# Patient Record
Sex: MALE | Race: WHITE | Employment: OTHER | URBAN - NONMETROPOLITAN AREA
[De-identification: names, ages, dates, MRNs, and addresses within clinical notes are randomized per-mention and may not be internally consistent; named-entity substitution may affect disease eponyms.]

---

## 2017-03-03 ENCOUNTER — TELEPHONE (OUTPATIENT)
Dept: CARDIOLOGY | Age: 42
End: 2017-03-03

## 2017-03-13 ENCOUNTER — TELEPHONE (OUTPATIENT)
Dept: CARDIOLOGY | Age: 42
End: 2017-03-13

## 2019-11-16 ENCOUNTER — HOSPITAL ENCOUNTER (INPATIENT)
Age: 44
LOS: 2 days | Discharge: HOME | DRG: 287 | End: 2019-11-19
Attending: HOSPITALIST | Admitting: FAMILY MEDICINE

## 2019-11-16 ENCOUNTER — APPOINTMENT (OUTPATIENT)
Dept: GENERAL RADIOLOGY | Age: 44
DRG: 287 | End: 2019-11-16
Attending: HOSPITALIST

## 2019-11-16 DIAGNOSIS — E78.2 MIXED HYPERLIPIDEMIA: ICD-10-CM

## 2019-11-16 DIAGNOSIS — I25.10 CORONARY ARTERY DISEASE INVOLVING NATIVE CORONARY ARTERY OF NATIVE HEART WITHOUT ANGINA PECTORIS: ICD-10-CM

## 2019-11-16 PROBLEM — I50.22 CHRONIC SYSTOLIC CONGESTIVE HEART FAILURE (HCC): Status: ACTIVE | Noted: 2019-11-16

## 2019-11-16 PROBLEM — I20.9 ISCHEMIC CHEST PAIN (HCC): Status: ACTIVE | Noted: 2019-11-16

## 2019-11-16 LAB
ALBUMIN SERPL-MCNC: 4.2 G/DL (ref 3.5–5.2)
ALP BLD-CCNC: 121 U/L (ref 40–130)
ALT SERPL-CCNC: 67 U/L (ref 5–41)
ANION GAP SERPL CALCULATED.3IONS-SCNC: 12 MMOL/L (ref 7–19)
APTT: 25.8 SEC (ref 26–36.2)
APTT: 34.9 SEC (ref 26–36.2)
APTT: 51.9 SEC (ref 26–36.2)
AST SERPL-CCNC: 27 U/L (ref 5–40)
BASOPHILS ABSOLUTE: 0.1 K/UL (ref 0–0.2)
BASOPHILS RELATIVE PERCENT: 0.8 % (ref 0–1)
BILIRUB SERPL-MCNC: <0.2 MG/DL (ref 0.2–1.2)
BUN BLDV-MCNC: 15 MG/DL (ref 6–20)
CALCIUM SERPL-MCNC: 9.7 MG/DL (ref 8.6–10)
CHLORIDE BLD-SCNC: 103 MMOL/L (ref 98–111)
CHOLESTEROL, TOTAL: 280 MG/DL (ref 160–199)
CO2: 24 MMOL/L (ref 22–29)
CREAT SERPL-MCNC: 0.8 MG/DL (ref 0.5–1.2)
EOSINOPHILS ABSOLUTE: 0.2 K/UL (ref 0–0.6)
EOSINOPHILS RELATIVE PERCENT: 2.5 % (ref 0–5)
GFR NON-AFRICAN AMERICAN: >60
GLUCOSE BLD-MCNC: 99 MG/DL (ref 74–109)
HBA1C MFR BLD: 5.5 % (ref 4–6)
HCT VFR BLD CALC: 46.5 % (ref 42–52)
HDLC SERPL-MCNC: 41 MG/DL (ref 55–121)
HEMOGLOBIN: 15.3 G/DL (ref 14–18)
IMMATURE GRANULOCYTES #: 0 K/UL
INR BLD: 0.97 (ref 0.88–1.18)
LDL CHOLESTEROL CALCULATED: 198 MG/DL
LV EF: 35 %
LVEF MODALITY: NORMAL
LYMPHOCYTES ABSOLUTE: 3.7 K/UL (ref 1.1–4.5)
LYMPHOCYTES RELATIVE PERCENT: 38.7 % (ref 20–40)
MAGNESIUM: 2.2 MG/DL (ref 1.6–2.6)
MCH RBC QN AUTO: 30.7 PG (ref 27–31)
MCHC RBC AUTO-ENTMCNC: 32.9 G/DL (ref 33–37)
MCV RBC AUTO: 93.2 FL (ref 80–94)
MONOCYTES ABSOLUTE: 0.7 K/UL (ref 0–0.9)
MONOCYTES RELATIVE PERCENT: 7.7 % (ref 0–10)
NEUTROPHILS ABSOLUTE: 4.9 K/UL (ref 1.5–7.5)
NEUTROPHILS RELATIVE PERCENT: 50.1 % (ref 50–65)
PDW BLD-RTO: 13.5 % (ref 11.5–14.5)
PLATELET # BLD: 217 K/UL (ref 130–400)
PMV BLD AUTO: 11.4 FL (ref 9.4–12.4)
POTASSIUM REFLEX MAGNESIUM: 4.4 MMOL/L (ref 3.5–5)
PROTHROMBIN TIME: 12.3 SEC (ref 12–14.6)
RBC # BLD: 4.99 M/UL (ref 4.7–6.1)
SODIUM BLD-SCNC: 139 MMOL/L (ref 136–145)
TOTAL PROTEIN: 7.1 G/DL (ref 6.6–8.7)
TRIGL SERPL-MCNC: 204 MG/DL (ref 0–149)
TROPONIN: 0.06 NG/ML (ref 0–0.03)
TROPONIN: 0.11 NG/ML (ref 0–0.03)
TROPONIN: 0.14 NG/ML (ref 0–0.03)
WBC # BLD: 9.7 K/UL (ref 4.8–10.8)

## 2019-11-16 PROCEDURE — G0378 HOSPITAL OBSERVATION PER HR: HCPCS

## 2019-11-16 PROCEDURE — G0379 DIRECT REFER HOSPITAL OBSERV: HCPCS

## 2019-11-16 PROCEDURE — 2580000003 HC RX 258: Performed by: HOSPITALIST

## 2019-11-16 PROCEDURE — 85730 THROMBOPLASTIN TIME PARTIAL: CPT

## 2019-11-16 PROCEDURE — 6360000002 HC RX W HCPCS: Performed by: INTERNAL MEDICINE

## 2019-11-16 PROCEDURE — 71045 X-RAY EXAM CHEST 1 VIEW: CPT

## 2019-11-16 PROCEDURE — 83735 ASSAY OF MAGNESIUM: CPT

## 2019-11-16 PROCEDURE — 36415 COLL VENOUS BLD VENIPUNCTURE: CPT

## 2019-11-16 PROCEDURE — 84484 ASSAY OF TROPONIN QUANT: CPT

## 2019-11-16 PROCEDURE — 83036 HEMOGLOBIN GLYCOSYLATED A1C: CPT

## 2019-11-16 PROCEDURE — 96366 THER/PROPH/DIAG IV INF ADDON: CPT

## 2019-11-16 PROCEDURE — 85610 PROTHROMBIN TIME: CPT

## 2019-11-16 PROCEDURE — 96365 THER/PROPH/DIAG IV INF INIT: CPT

## 2019-11-16 PROCEDURE — 93306 TTE W/DOPPLER COMPLETE: CPT

## 2019-11-16 PROCEDURE — 6370000000 HC RX 637 (ALT 250 FOR IP): Performed by: HOSPITALIST

## 2019-11-16 PROCEDURE — 85025 COMPLETE CBC W/AUTO DIFF WBC: CPT

## 2019-11-16 PROCEDURE — 80053 COMPREHEN METABOLIC PANEL: CPT

## 2019-11-16 PROCEDURE — 80061 LIPID PANEL: CPT

## 2019-11-16 RX ORDER — HEPARIN SODIUM 1000 [USP'U]/ML
2000 INJECTION, SOLUTION INTRAVENOUS; SUBCUTANEOUS PRN
Status: DISCONTINUED | OUTPATIENT
Start: 2019-11-16 | End: 2019-11-18

## 2019-11-16 RX ORDER — ASPIRIN 81 MG/1
81 TABLET, CHEWABLE ORAL DAILY
Status: DISCONTINUED | OUTPATIENT
Start: 2019-11-17 | End: 2019-11-19 | Stop reason: HOSPADM

## 2019-11-16 RX ORDER — RANITIDINE 150 MG/1
150 TABLET ORAL DAILY
COMMUNITY
End: 2020-02-28

## 2019-11-16 RX ORDER — CLOPIDOGREL BISULFATE 75 MG/1
75 TABLET ORAL DAILY
Status: DISCONTINUED | OUTPATIENT
Start: 2019-11-16 | End: 2019-11-19 | Stop reason: HOSPADM

## 2019-11-16 RX ORDER — NITROGLYCERIN 0.4 MG/1
0.4 TABLET SUBLINGUAL EVERY 5 MIN PRN
Status: DISCONTINUED | OUTPATIENT
Start: 2019-11-16 | End: 2019-11-18

## 2019-11-16 RX ORDER — POLYETHYLENE GLYCOL 3350 17 G/17G
17 POWDER, FOR SOLUTION ORAL DAILY PRN
Status: DISCONTINUED | OUTPATIENT
Start: 2019-11-16 | End: 2019-11-18

## 2019-11-16 RX ORDER — SODIUM CHLORIDE 0.9 % (FLUSH) 0.9 %
10 SYRINGE (ML) INJECTION EVERY 12 HOURS SCHEDULED
Status: DISCONTINUED | OUTPATIENT
Start: 2019-11-16 | End: 2019-11-19 | Stop reason: HOSPADM

## 2019-11-16 RX ORDER — HEPARIN SODIUM 10000 [USP'U]/100ML
10.5 INJECTION, SOLUTION INTRAVENOUS CONTINUOUS
Status: DISCONTINUED | OUTPATIENT
Start: 2019-11-16 | End: 2019-11-18

## 2019-11-16 RX ORDER — HEPARIN SODIUM 1000 [USP'U]/ML
30 INJECTION, SOLUTION INTRAVENOUS; SUBCUTANEOUS PRN
Status: DISCONTINUED | OUTPATIENT
Start: 2019-11-16 | End: 2019-11-16

## 2019-11-16 RX ORDER — HEPARIN SODIUM 1000 [USP'U]/ML
4000 INJECTION, SOLUTION INTRAVENOUS; SUBCUTANEOUS PRN
Status: DISCONTINUED | OUTPATIENT
Start: 2019-11-16 | End: 2019-11-18

## 2019-11-16 RX ORDER — HEPARIN SODIUM 1000 [USP'U]/ML
60 INJECTION, SOLUTION INTRAVENOUS; SUBCUTANEOUS PRN
Status: DISCONTINUED | OUTPATIENT
Start: 2019-11-16 | End: 2019-11-16

## 2019-11-16 RX ORDER — LANOLIN ALCOHOL/MO/W.PET/CERES
3 CREAM (GRAM) TOPICAL NIGHTLY PRN
Status: DISCONTINUED | OUTPATIENT
Start: 2019-11-16 | End: 2019-11-19 | Stop reason: HOSPADM

## 2019-11-16 RX ORDER — HEPARIN SODIUM 1000 [USP'U]/ML
60 INJECTION, SOLUTION INTRAVENOUS; SUBCUTANEOUS ONCE
Status: COMPLETED | OUTPATIENT
Start: 2019-11-16 | End: 2019-11-16

## 2019-11-16 RX ORDER — DOCUSATE SODIUM 100 MG/1
100 CAPSULE, LIQUID FILLED ORAL NIGHTLY
Status: ON HOLD | COMMUNITY
End: 2019-11-19 | Stop reason: HOSPADM

## 2019-11-16 RX ORDER — ONDANSETRON 2 MG/ML
4 INJECTION INTRAMUSCULAR; INTRAVENOUS EVERY 6 HOURS PRN
Status: DISCONTINUED | OUTPATIENT
Start: 2019-11-16 | End: 2019-11-18

## 2019-11-16 RX ORDER — ATORVASTATIN CALCIUM 40 MG/1
40 TABLET, FILM COATED ORAL NIGHTLY
Status: DISCONTINUED | OUTPATIENT
Start: 2019-11-16 | End: 2019-11-19 | Stop reason: HOSPADM

## 2019-11-16 RX ORDER — SODIUM CHLORIDE 0.9 % (FLUSH) 0.9 %
10 SYRINGE (ML) INJECTION PRN
Status: DISCONTINUED | OUTPATIENT
Start: 2019-11-16 | End: 2019-11-19 | Stop reason: HOSPADM

## 2019-11-16 RX ORDER — FAMOTIDINE 20 MG/1
20 TABLET, FILM COATED ORAL DAILY
Status: DISCONTINUED | OUTPATIENT
Start: 2019-11-16 | End: 2019-11-19 | Stop reason: HOSPADM

## 2019-11-16 RX ORDER — METOPROLOL SUCCINATE 50 MG/1
50 TABLET, EXTENDED RELEASE ORAL DAILY
Status: DISCONTINUED | OUTPATIENT
Start: 2019-11-16 | End: 2019-11-18

## 2019-11-16 RX ADMIN — ATORVASTATIN CALCIUM 40 MG: 40 TABLET, FILM COATED ORAL at 19:55

## 2019-11-16 RX ADMIN — HEPARIN SODIUM 10.5 UNITS/KG/HR: 10000 INJECTION, SOLUTION INTRAVENOUS at 09:45

## 2019-11-16 RX ADMIN — CLOPIDOGREL BISULFATE 75 MG: 75 TABLET ORAL at 09:55

## 2019-11-16 RX ADMIN — FAMOTIDINE 20 MG: 20 TABLET ORAL at 09:55

## 2019-11-16 RX ADMIN — METOPROLOL SUCCINATE 50 MG: 50 TABLET, EXTENDED RELEASE ORAL at 09:54

## 2019-11-16 RX ADMIN — Medication 10 ML: at 09:54

## 2019-11-16 RX ADMIN — HEPARIN SODIUM 5670 UNITS: 1000 INJECTION INTRAVENOUS; SUBCUTANEOUS at 09:45

## 2019-11-16 RX ADMIN — HEPARIN SODIUM 4000 UNITS: 1000 INJECTION INTRAVENOUS; SUBCUTANEOUS at 21:53

## 2019-11-16 ASSESSMENT — ENCOUNTER SYMPTOMS
TROUBLE SWALLOWING: 0
ABDOMINAL PAIN: 0
EYE REDNESS: 0
SHORTNESS OF BREATH: 1
EYES NEGATIVE: 1
CHEST TIGHTNESS: 1
COUGH: 0
NAUSEA: 0
ROS SKIN COMMENTS: TATTOOS
SORE THROAT: 0
SHORTNESS OF BREATH: 0
ALLERGIC/IMMUNOLOGIC NEGATIVE: 1
WHEEZING: 0
GASTROINTESTINAL NEGATIVE: 1
COLOR CHANGE: 0
VOMITING: 0

## 2019-11-16 ASSESSMENT — PAIN SCALES - GENERAL
PAINLEVEL_OUTOF10: 0

## 2019-11-17 LAB
ANION GAP SERPL CALCULATED.3IONS-SCNC: 14 MMOL/L (ref 7–19)
APTT: 45.7 SEC (ref 26–36.2)
APTT: 47 SEC (ref 26–36.2)
APTT: 51.7 SEC (ref 26–36.2)
BUN BLDV-MCNC: 16 MG/DL (ref 6–20)
CALCIUM SERPL-MCNC: 9.3 MG/DL (ref 8.6–10)
CHLORIDE BLD-SCNC: 103 MMOL/L (ref 98–111)
CO2: 22 MMOL/L (ref 22–29)
CREAT SERPL-MCNC: 0.9 MG/DL (ref 0.5–1.2)
GFR NON-AFRICAN AMERICAN: >60
GLUCOSE BLD-MCNC: 106 MG/DL (ref 74–109)
HCT VFR BLD CALC: 47.3 % (ref 42–52)
HEMOGLOBIN: 15.1 G/DL (ref 14–18)
MCH RBC QN AUTO: 30.2 PG (ref 27–31)
MCHC RBC AUTO-ENTMCNC: 31.9 G/DL (ref 33–37)
MCV RBC AUTO: 94.6 FL (ref 80–94)
PDW BLD-RTO: 13.4 % (ref 11.5–14.5)
PLATELET # BLD: 218 K/UL (ref 130–400)
PMV BLD AUTO: 11.5 FL (ref 9.4–12.4)
POTASSIUM REFLEX MAGNESIUM: 4.2 MMOL/L (ref 3.5–5)
RBC # BLD: 5 M/UL (ref 4.7–6.1)
SODIUM BLD-SCNC: 139 MMOL/L (ref 136–145)
WBC # BLD: 11.5 K/UL (ref 4.8–10.8)

## 2019-11-17 PROCEDURE — 6360000002 HC RX W HCPCS: Performed by: INTERNAL MEDICINE

## 2019-11-17 PROCEDURE — 2580000003 HC RX 258: Performed by: HOSPITALIST

## 2019-11-17 PROCEDURE — 85730 THROMBOPLASTIN TIME PARTIAL: CPT

## 2019-11-17 PROCEDURE — 6370000000 HC RX 637 (ALT 250 FOR IP): Performed by: HOSPITALIST

## 2019-11-17 PROCEDURE — 2140000000 HC CCU INTERMEDIATE R&B

## 2019-11-17 PROCEDURE — 36415 COLL VENOUS BLD VENIPUNCTURE: CPT

## 2019-11-17 PROCEDURE — 80048 BASIC METABOLIC PNL TOTAL CA: CPT

## 2019-11-17 PROCEDURE — 85027 COMPLETE CBC AUTOMATED: CPT

## 2019-11-17 PROCEDURE — 96366 THER/PROPH/DIAG IV INF ADDON: CPT

## 2019-11-17 RX ADMIN — ATORVASTATIN CALCIUM 40 MG: 40 TABLET, FILM COATED ORAL at 18:21

## 2019-11-17 RX ADMIN — Medication 10 ML: at 08:33

## 2019-11-17 RX ADMIN — METOPROLOL SUCCINATE 50 MG: 50 TABLET, EXTENDED RELEASE ORAL at 08:33

## 2019-11-17 RX ADMIN — HEPARIN SODIUM 2000 UNITS: 1000 INJECTION INTRAVENOUS; SUBCUTANEOUS at 05:25

## 2019-11-17 RX ADMIN — HEPARIN SODIUM 2000 UNITS: 1000 INJECTION INTRAVENOUS; SUBCUTANEOUS at 19:35

## 2019-11-17 RX ADMIN — FAMOTIDINE 20 MG: 20 TABLET ORAL at 08:33

## 2019-11-17 RX ADMIN — ASPIRIN 81 MG 81 MG: 81 TABLET ORAL at 08:33

## 2019-11-17 RX ADMIN — CLOPIDOGREL BISULFATE 75 MG: 75 TABLET ORAL at 08:33

## 2019-11-17 ASSESSMENT — ENCOUNTER SYMPTOMS
WHEEZING: 0
NAUSEA: 0
EYE REDNESS: 0
TROUBLE SWALLOWING: 0
VOMITING: 0
COUGH: 0
SORE THROAT: 0
CHEST TIGHTNESS: 0
COLOR CHANGE: 0

## 2019-11-18 LAB
ANION GAP SERPL CALCULATED.3IONS-SCNC: 14 MMOL/L (ref 7–19)
APTT: 55.1 SEC (ref 26–36.2)
BUN BLDV-MCNC: 14 MG/DL (ref 6–20)
CALCIUM SERPL-MCNC: 9.2 MG/DL (ref 8.6–10)
CHLORIDE BLD-SCNC: 103 MMOL/L (ref 98–111)
CO2: 22 MMOL/L (ref 22–29)
CREAT SERPL-MCNC: 0.9 MG/DL (ref 0.5–1.2)
GFR NON-AFRICAN AMERICAN: >60
GLUCOSE BLD-MCNC: 117 MG/DL (ref 74–109)
HCT VFR BLD CALC: 44.5 % (ref 42–52)
HEMOGLOBIN: 14.7 G/DL (ref 14–18)
MCH RBC QN AUTO: 30.6 PG (ref 27–31)
MCHC RBC AUTO-ENTMCNC: 33 G/DL (ref 33–37)
MCV RBC AUTO: 92.5 FL (ref 80–94)
PDW BLD-RTO: 13.4 % (ref 11.5–14.5)
PLATELET # BLD: 205 K/UL (ref 130–400)
PMV BLD AUTO: 11.7 FL (ref 9.4–12.4)
POTASSIUM REFLEX MAGNESIUM: 3.8 MMOL/L (ref 3.5–5)
RBC # BLD: 4.81 M/UL (ref 4.7–6.1)
SODIUM BLD-SCNC: 139 MMOL/L (ref 136–145)
WBC # BLD: 9.4 K/UL (ref 4.8–10.8)

## 2019-11-18 PROCEDURE — 2580000003 HC RX 258: Performed by: INTERNAL MEDICINE

## 2019-11-18 PROCEDURE — 2140000000 HC CCU INTERMEDIATE R&B

## 2019-11-18 PROCEDURE — 2500000003 HC RX 250 WO HCPCS

## 2019-11-18 PROCEDURE — 6360000004 HC RX CONTRAST MEDICATION: Performed by: FAMILY MEDICINE

## 2019-11-18 PROCEDURE — 6370000000 HC RX 637 (ALT 250 FOR IP): Performed by: INTERNAL MEDICINE

## 2019-11-18 PROCEDURE — 2709999900 HC NON-CHARGEABLE SUPPLY

## 2019-11-18 PROCEDURE — 93458 L HRT ARTERY/VENTRICLE ANGIO: CPT

## 2019-11-18 PROCEDURE — 4A023N7 MEASUREMENT OF CARDIAC SAMPLING AND PRESSURE, LEFT HEART, PERCUTANEOUS APPROACH: ICD-10-PCS | Performed by: INTERNAL MEDICINE

## 2019-11-18 PROCEDURE — C1887 CATHETER, GUIDING: HCPCS

## 2019-11-18 PROCEDURE — 85730 THROMBOPLASTIN TIME PARTIAL: CPT

## 2019-11-18 PROCEDURE — 36415 COLL VENOUS BLD VENIPUNCTURE: CPT

## 2019-11-18 PROCEDURE — 93458 L HRT ARTERY/VENTRICLE ANGIO: CPT | Performed by: INTERNAL MEDICINE

## 2019-11-18 PROCEDURE — 99152 MOD SED SAME PHYS/QHP 5/>YRS: CPT | Performed by: INTERNAL MEDICINE

## 2019-11-18 PROCEDURE — B2151ZZ FLUOROSCOPY OF LEFT HEART USING LOW OSMOLAR CONTRAST: ICD-10-PCS | Performed by: INTERNAL MEDICINE

## 2019-11-18 PROCEDURE — C1769 GUIDE WIRE: HCPCS

## 2019-11-18 PROCEDURE — 99152 MOD SED SAME PHYS/QHP 5/>YRS: CPT

## 2019-11-18 PROCEDURE — 6360000002 HC RX W HCPCS: Performed by: INTERNAL MEDICINE

## 2019-11-18 PROCEDURE — 85027 COMPLETE CBC AUTOMATED: CPT

## 2019-11-18 PROCEDURE — 6360000002 HC RX W HCPCS

## 2019-11-18 PROCEDURE — 80048 BASIC METABOLIC PNL TOTAL CA: CPT

## 2019-11-18 PROCEDURE — C1894 INTRO/SHEATH, NON-LASER: HCPCS

## 2019-11-18 PROCEDURE — B2111ZZ FLUOROSCOPY OF MULTIPLE CORONARY ARTERIES USING LOW OSMOLAR CONTRAST: ICD-10-PCS | Performed by: INTERNAL MEDICINE

## 2019-11-18 PROCEDURE — 99153 MOD SED SAME PHYS/QHP EA: CPT

## 2019-11-18 RX ORDER — SODIUM CHLORIDE 0.9 % (FLUSH) 0.9 %
10 SYRINGE (ML) INJECTION PRN
Status: DISCONTINUED | OUTPATIENT
Start: 2019-11-18 | End: 2019-11-19 | Stop reason: HOSPADM

## 2019-11-18 RX ORDER — LISINOPRIL 5 MG/1
5 TABLET ORAL DAILY
Status: DISCONTINUED | OUTPATIENT
Start: 2019-11-18 | End: 2019-11-19 | Stop reason: HOSPADM

## 2019-11-18 RX ORDER — SODIUM CHLORIDE 0.9 % (FLUSH) 0.9 %
10 SYRINGE (ML) INJECTION EVERY 12 HOURS SCHEDULED
Status: DISCONTINUED | OUTPATIENT
Start: 2019-11-18 | End: 2019-11-19 | Stop reason: HOSPADM

## 2019-11-18 RX ORDER — ACETAMINOPHEN 325 MG/1
650 TABLET ORAL EVERY 4 HOURS PRN
Status: DISCONTINUED | OUTPATIENT
Start: 2019-11-18 | End: 2019-11-19 | Stop reason: HOSPADM

## 2019-11-18 RX ORDER — ONDANSETRON 2 MG/ML
4 INJECTION INTRAMUSCULAR; INTRAVENOUS EVERY 6 HOURS PRN
Status: DISCONTINUED | OUTPATIENT
Start: 2019-11-18 | End: 2019-11-19 | Stop reason: HOSPADM

## 2019-11-18 RX ORDER — CARVEDILOL 6.25 MG/1
6.25 TABLET ORAL 2 TIMES DAILY WITH MEALS
Status: DISCONTINUED | OUTPATIENT
Start: 2019-11-18 | End: 2019-11-19 | Stop reason: HOSPADM

## 2019-11-18 RX ORDER — SODIUM CHLORIDE 9 MG/ML
INJECTION, SOLUTION INTRAVENOUS CONTINUOUS
Status: DISCONTINUED | OUTPATIENT
Start: 2019-11-18 | End: 2019-11-19 | Stop reason: HOSPADM

## 2019-11-18 RX ADMIN — ATORVASTATIN CALCIUM 40 MG: 40 TABLET, FILM COATED ORAL at 20:20

## 2019-11-18 RX ADMIN — SODIUM CHLORIDE: 9 INJECTION, SOLUTION INTRAVENOUS at 08:51

## 2019-11-18 RX ADMIN — IOPAMIDOL 97 ML: 612 INJECTION, SOLUTION INTRAVENOUS at 08:19

## 2019-11-18 RX ADMIN — ASPIRIN 81 MG 81 MG: 81 TABLET ORAL at 08:51

## 2019-11-18 RX ADMIN — CARVEDILOL 6.25 MG: 6.25 TABLET, FILM COATED ORAL at 08:51

## 2019-11-18 RX ADMIN — ACETAMINOPHEN 650 MG: 325 TABLET ORAL at 20:20

## 2019-11-18 RX ADMIN — CARVEDILOL 6.25 MG: 6.25 TABLET, FILM COATED ORAL at 20:20

## 2019-11-18 RX ADMIN — Medication 10 ML: at 20:20

## 2019-11-18 RX ADMIN — FAMOTIDINE 20 MG: 20 TABLET ORAL at 08:51

## 2019-11-18 RX ADMIN — SODIUM CHLORIDE: 9 INJECTION, SOLUTION INTRAVENOUS at 05:18

## 2019-11-18 RX ADMIN — HEPARIN SODIUM 13.9 UNITS/KG/HR: 10000 INJECTION, SOLUTION INTRAVENOUS at 05:33

## 2019-11-18 RX ADMIN — CLOPIDOGREL BISULFATE 75 MG: 75 TABLET ORAL at 08:51

## 2019-11-18 RX ADMIN — LISINOPRIL 5 MG: 5 TABLET ORAL at 08:51

## 2019-11-18 ASSESSMENT — PAIN SCALES - GENERAL
PAINLEVEL_OUTOF10: 0
PAINLEVEL_OUTOF10: 0
PAINLEVEL_OUTOF10: 2
PAINLEVEL_OUTOF10: 2

## 2019-11-18 ASSESSMENT — ENCOUNTER SYMPTOMS
SORE THROAT: 0
COUGH: 0
COLOR CHANGE: 0
VOMITING: 0
WHEEZING: 0
CHEST TIGHTNESS: 0
TROUBLE SWALLOWING: 0
NAUSEA: 0
EYE REDNESS: 0

## 2019-11-18 ASSESSMENT — PAIN DESCRIPTION - FREQUENCY: FREQUENCY: INTERMITTENT

## 2019-11-18 ASSESSMENT — PAIN DESCRIPTION - PAIN TYPE
TYPE: ACUTE PAIN
TYPE: ACUTE PAIN

## 2019-11-18 ASSESSMENT — PAIN DESCRIPTION - ORIENTATION
ORIENTATION: LEFT
ORIENTATION: LEFT

## 2019-11-18 ASSESSMENT — PAIN DESCRIPTION - DESCRIPTORS: DESCRIPTORS: SQUEEZING

## 2019-11-18 ASSESSMENT — PAIN DESCRIPTION - LOCATION
LOCATION: ARM;SHOULDER;NECK
LOCATION: SHOULDER

## 2019-11-19 VITALS
HEIGHT: 71 IN | WEIGHT: 209.13 LBS | OXYGEN SATURATION: 96 % | BODY MASS INDEX: 29.28 KG/M2 | SYSTOLIC BLOOD PRESSURE: 100 MMHG | DIASTOLIC BLOOD PRESSURE: 68 MMHG | TEMPERATURE: 97.2 F | HEART RATE: 71 BPM | RESPIRATION RATE: 16 BRPM

## 2019-11-19 LAB
ANION GAP SERPL CALCULATED.3IONS-SCNC: 12 MMOL/L (ref 7–19)
BUN BLDV-MCNC: 13 MG/DL (ref 6–20)
CALCIUM SERPL-MCNC: 9 MG/DL (ref 8.6–10)
CHLORIDE BLD-SCNC: 103 MMOL/L (ref 98–111)
CO2: 21 MMOL/L (ref 22–29)
CREAT SERPL-MCNC: 0.9 MG/DL (ref 0.5–1.2)
GFR NON-AFRICAN AMERICAN: >60
GLUCOSE BLD-MCNC: 108 MG/DL (ref 74–109)
HCT VFR BLD CALC: 43.1 % (ref 42–52)
HEMOGLOBIN: 14 G/DL (ref 14–18)
MCH RBC QN AUTO: 30.4 PG (ref 27–31)
MCHC RBC AUTO-ENTMCNC: 32.5 G/DL (ref 33–37)
MCV RBC AUTO: 93.7 FL (ref 80–94)
PDW BLD-RTO: 13.3 % (ref 11.5–14.5)
PLATELET # BLD: 196 K/UL (ref 130–400)
PMV BLD AUTO: 11.6 FL (ref 9.4–12.4)
POTASSIUM REFLEX MAGNESIUM: 4.1 MMOL/L (ref 3.5–5)
RBC # BLD: 4.6 M/UL (ref 4.7–6.1)
SODIUM BLD-SCNC: 136 MMOL/L (ref 136–145)
WBC # BLD: 9.4 K/UL (ref 4.8–10.8)

## 2019-11-19 PROCEDURE — 6370000000 HC RX 637 (ALT 250 FOR IP): Performed by: INTERNAL MEDICINE

## 2019-11-19 PROCEDURE — 80048 BASIC METABOLIC PNL TOTAL CA: CPT

## 2019-11-19 PROCEDURE — 85027 COMPLETE CBC AUTOMATED: CPT

## 2019-11-19 PROCEDURE — 2580000003 HC RX 258: Performed by: INTERNAL MEDICINE

## 2019-11-19 PROCEDURE — 36415 COLL VENOUS BLD VENIPUNCTURE: CPT

## 2019-11-19 RX ORDER — CARVEDILOL 6.25 MG/1
6.25 TABLET ORAL 2 TIMES DAILY WITH MEALS
Qty: 60 TABLET | Refills: 0 | Status: SHIPPED | OUTPATIENT
Start: 2019-11-19 | End: 2019-12-12 | Stop reason: SDUPTHER

## 2019-11-19 RX ORDER — ASPIRIN 81 MG/1
81 TABLET, CHEWABLE ORAL DAILY
Qty: 30 TABLET | Refills: 0 | Status: SHIPPED | OUTPATIENT
Start: 2019-11-20

## 2019-11-19 RX ORDER — LISINOPRIL 5 MG/1
5 TABLET ORAL DAILY
Qty: 30 TABLET | Refills: 0 | Status: SHIPPED | OUTPATIENT
Start: 2019-11-20 | End: 2019-12-12 | Stop reason: SDUPTHER

## 2019-11-19 RX ORDER — CLOPIDOGREL BISULFATE 75 MG/1
75 TABLET ORAL DAILY
Qty: 30 TABLET | Refills: 0 | Status: SHIPPED | OUTPATIENT
Start: 2019-11-19 | End: 2019-12-12 | Stop reason: SDUPTHER

## 2019-11-19 RX ORDER — ATORVASTATIN CALCIUM 40 MG/1
40 TABLET, FILM COATED ORAL DAILY
Qty: 30 TABLET | Refills: 0 | Status: SHIPPED | OUTPATIENT
Start: 2019-11-19 | End: 2019-12-12 | Stop reason: SDUPTHER

## 2019-11-19 RX ADMIN — Medication 10 ML: at 08:36

## 2019-11-19 RX ADMIN — FAMOTIDINE 20 MG: 20 TABLET ORAL at 08:35

## 2019-11-19 RX ADMIN — CLOPIDOGREL BISULFATE 75 MG: 75 TABLET ORAL at 08:35

## 2019-11-19 RX ADMIN — LISINOPRIL 5 MG: 5 TABLET ORAL at 08:35

## 2019-11-19 RX ADMIN — CARVEDILOL 6.25 MG: 6.25 TABLET, FILM COATED ORAL at 08:35

## 2019-11-19 RX ADMIN — ASPIRIN 81 MG 81 MG: 81 TABLET ORAL at 08:35

## 2019-11-19 ASSESSMENT — PAIN SCALES - GENERAL: PAINLEVEL_OUTOF10: 0

## 2019-12-12 DIAGNOSIS — I25.10 CORONARY ARTERY DISEASE INVOLVING NATIVE CORONARY ARTERY OF NATIVE HEART WITHOUT ANGINA PECTORIS: ICD-10-CM

## 2019-12-12 DIAGNOSIS — E78.2 MIXED HYPERLIPIDEMIA: ICD-10-CM

## 2019-12-12 RX ORDER — CARVEDILOL 6.25 MG/1
6.25 TABLET ORAL 2 TIMES DAILY WITH MEALS
Qty: 60 TABLET | Refills: 3 | Status: SHIPPED | OUTPATIENT
Start: 2019-12-12 | End: 2020-04-13

## 2019-12-12 RX ORDER — CLOPIDOGREL BISULFATE 75 MG/1
75 TABLET ORAL DAILY
Qty: 30 TABLET | Refills: 3 | Status: SHIPPED | OUTPATIENT
Start: 2019-12-12 | End: 2020-04-13

## 2019-12-12 RX ORDER — ATORVASTATIN CALCIUM 40 MG/1
40 TABLET, FILM COATED ORAL DAILY
Qty: 30 TABLET | Refills: 3 | Status: SHIPPED
Start: 2019-12-12 | End: 2020-02-05 | Stop reason: DRUGHIGH

## 2019-12-12 RX ORDER — LISINOPRIL 5 MG/1
5 TABLET ORAL DAILY
Qty: 30 TABLET | Refills: 3 | Status: SHIPPED | OUTPATIENT
Start: 2019-12-12 | End: 2020-04-13

## 2019-12-30 ENCOUNTER — OFFICE VISIT (OUTPATIENT)
Dept: CARDIOLOGY | Age: 44
End: 2019-12-30

## 2019-12-30 VITALS
HEIGHT: 70 IN | DIASTOLIC BLOOD PRESSURE: 80 MMHG | BODY MASS INDEX: 31.78 KG/M2 | WEIGHT: 222 LBS | SYSTOLIC BLOOD PRESSURE: 112 MMHG | HEART RATE: 70 BPM

## 2019-12-30 DIAGNOSIS — F17.210 CIGARETTE NICOTINE DEPENDENCE WITHOUT COMPLICATION: ICD-10-CM

## 2019-12-30 DIAGNOSIS — E78.2 MIXED HYPERLIPIDEMIA: ICD-10-CM

## 2019-12-30 DIAGNOSIS — I10 ESSENTIAL HYPERTENSION: ICD-10-CM

## 2019-12-30 DIAGNOSIS — I25.10 CORONARY ARTERY DISEASE INVOLVING NATIVE CORONARY ARTERY OF NATIVE HEART WITHOUT ANGINA PECTORIS: Primary | ICD-10-CM

## 2019-12-30 DIAGNOSIS — I50.22 CHRONIC SYSTOLIC HEART FAILURE (HCC): ICD-10-CM

## 2019-12-30 PROCEDURE — 99214 OFFICE O/P EST MOD 30 MIN: CPT | Performed by: CLINICAL NURSE SPECIALIST

## 2019-12-30 RX ORDER — FUROSEMIDE 20 MG/1
20 TABLET ORAL PRN
Qty: 30 TABLET | Refills: 3 | Status: SHIPPED | OUTPATIENT
Start: 2019-12-30 | End: 2021-04-13

## 2019-12-30 RX ORDER — NITROGLYCERIN 0.4 MG/1
0.4 TABLET SUBLINGUAL EVERY 5 MIN PRN
Qty: 25 TABLET | Refills: 3 | Status: SHIPPED | OUTPATIENT
Start: 2019-12-30 | End: 2021-04-26

## 2019-12-30 RX ORDER — FAMOTIDINE 10 MG
10 TABLET ORAL DAILY
COMMUNITY

## 2019-12-30 ASSESSMENT — ENCOUNTER SYMPTOMS
NAUSEA: 0
WHEEZING: 0
VOMITING: 0
CHEST TIGHTNESS: 0
EYE REDNESS: 0
FACIAL SWELLING: 0
SHORTNESS OF BREATH: 0
ABDOMINAL PAIN: 0
COUGH: 0

## 2020-02-05 ENCOUNTER — TELEPHONE (OUTPATIENT)
Dept: CARDIOLOGY | Age: 45
End: 2020-02-05

## 2020-02-05 RX ORDER — ATORVASTATIN CALCIUM 80 MG/1
80 TABLET, FILM COATED ORAL DAILY
Qty: 30 TABLET | Refills: 5 | Status: SHIPPED | OUTPATIENT
Start: 2020-02-05 | End: 2020-02-28 | Stop reason: SDUPTHER

## 2020-02-18 ENCOUNTER — HOSPITAL ENCOUNTER (OUTPATIENT)
Dept: NON INVASIVE DIAGNOSTICS | Age: 45
Discharge: HOME | End: 2020-02-18

## 2020-02-18 LAB
LV EF: 40 %
LVEF MODALITY: NORMAL

## 2020-02-18 PROCEDURE — 93308 TTE F-UP OR LMTD: CPT

## 2020-02-21 ENCOUNTER — TELEPHONE (OUTPATIENT)
Dept: CARDIOLOGY | Age: 45
End: 2020-02-21

## 2020-02-28 ENCOUNTER — OFFICE VISIT (OUTPATIENT)
Dept: CARDIOLOGY | Age: 45
End: 2020-02-28

## 2020-02-28 VITALS
HEIGHT: 70 IN | HEART RATE: 87 BPM | WEIGHT: 227 LBS | SYSTOLIC BLOOD PRESSURE: 118 MMHG | DIASTOLIC BLOOD PRESSURE: 80 MMHG | BODY MASS INDEX: 32.5 KG/M2

## 2020-02-28 PROBLEM — I20.9 ISCHEMIC CHEST PAIN (HCC): Status: RESOLVED | Noted: 2019-11-16 | Resolved: 2020-02-28

## 2020-02-28 PROCEDURE — 99213 OFFICE O/P EST LOW 20 MIN: CPT | Performed by: CLINICAL NURSE SPECIALIST

## 2020-02-28 RX ORDER — ATORVASTATIN CALCIUM 80 MG/1
80 TABLET, FILM COATED ORAL DAILY
Qty: 90 TABLET | Refills: 3 | Status: SHIPPED | OUTPATIENT
Start: 2020-02-28 | End: 2020-05-28 | Stop reason: SDUPTHER

## 2020-02-28 RX ORDER — ATORVASTATIN CALCIUM 40 MG/1
40 TABLET, FILM COATED ORAL DAILY
COMMUNITY
Start: 2020-02-15 | End: 2020-04-13

## 2020-02-28 ASSESSMENT — ENCOUNTER SYMPTOMS
WHEEZING: 0
VOMITING: 0
COUGH: 0
ABDOMINAL PAIN: 0
NAUSEA: 0
CHEST TIGHTNESS: 0
FACIAL SWELLING: 0
EYE REDNESS: 0
SHORTNESS OF BREATH: 0

## 2020-02-28 NOTE — PROGRESS NOTES
[facility], [address] [place] [address], [address] [id number]  Phone: [phone]  Fax: [phone]    OFFICE VISIT:  2020    Nic Jean - : 1975    Reason For Visit:  Donna Varner is a 39 y.o. male who is here for Follow-up (2 mo   patient has a little soa); Coronary Artery Disease; and Hyperlipidemia       Diagnosis Orders   1. Coronary artery disease involving native coronary artery of native heart without angina pectoris     2. Mixed hyperlipidemia  atorvastatin (LIPITOR) 80 MG tablet   3. Essential hypertension     4. Cigarette nicotine dependence without complication     5. Drug-induced erectile dysfunction       HPI  Patient is here for follow-up with a history of CAD. He had his first MI around  with 3 stents. He presented to [facility] in 2019 with chest pain and dyspnea. He was found to have a depressed EF of 35% and was started on guideline directed medical therapy. He had some moderate occlusion and did not require PCI. He had a repeat echo in February that showed improvement in his EF to 40%. He is doing well overall. He denies any unusual dyspnea, orthopnea, PND, edema, sudden weight gain, palpitations. He is having some issues with erectile dysfunction. He has remained smoke-free. He was told to increase his atorvastatin after his cholesterol lab work in January, but has not yet done so     No primary care provider on file. is PCP.   Nic Jean has the following history as recorded in [facility]:    Patient Active Problem List    Diagnosis Date Noted    Cigarette nicotine dependence without complication 39/15/0941    Chronic systolic congestive heart failure ([address] 2019    Essential hypertension 2016    Coronary artery disease involving native coronary artery of native heart without angina pectoris 2015    History of coronary artery stent placement 2015    Mixed hyperlipidemia 2015     Past Medical History:   Diagnosis Date    Hand numbness     Hand tingling     Heart attack ([address]     x 3     Past Surgical History:   Procedure Laterality Date    CARDIAC CATHETERIZATION  10/25/15  [facility]    stent to RCA. Stent to LAD. angioplasaty to diagonal branch of LAD.  KNEE ARTHROSCOPY      LAMINECTOMY      NECK SURGERY      x 2      Family History   Problem Relation Age of Onset    Cancer Mother     Cancer Father      Social History     Tobacco Use    Smoking status: Former Smoker     Packs/day: 0.50     Years: 30.00     Pack years: 15.00     Types: Cigarettes     Last attempt to quit: 2019     Years since quittin.3    Smokeless tobacco: Never Used   Substance Use Topics    Alcohol use: No      Current Outpatient Medications   Medication Sig Dispense Refill    atorvastatin (LIPITOR) 40 MG tablet Take 40 mg by mouth daily      atorvastatin (LIPITOR) 80 MG tablet Take 1 tablet by mouth daily 90 tablet 3    famotidine (PEPCID) 10 MG tablet Take 10 mg by mouth daily      furosemide (LASIX) 20 MG tablet Take 1 tablet by mouth as needed (weight gain) 30 tablet 3    nitroGLYCERIN (NITROSTAT) 0.4 MG SL tablet Place 1 tablet under the tongue every 5 minutes as needed for Chest pain 25 tablet 3    lisinopril (PRINIVIL;ZESTRIL) 5 MG tablet Take 1 tablet by mouth daily 30 tablet 3    carvedilol (COREG) 6.25 MG tablet Take 1 tablet by mouth 2 times daily (with meals) 60 tablet 3    clopidogrel (PLAVIX) 75 MG tablet Take 1 tablet by mouth daily 30 tablet 3    aspirin 81 MG chewable tablet Take 1 tablet by mouth daily 30 tablet 0    CYCLOBENZAPRINE HCL PO Take 10 mg by mouth nightly       No current facility-administered medications for this visit. Allergies: Patient has no known allergies. Review of Systems  Review of Systems   Constitutional: Negative for activity change, diaphoresis, fatigue, fever and unexpected weight change. HENT: Negative for facial swelling and nosebleeds.     Eyes: Negative for redness and visual disturbance. Respiratory: Negative for cough, chest tightness, shortness of breath and wheezing. Cardiovascular: Negative for chest pain, palpitations and leg swelling. Gastrointestinal: Negative for abdominal pain, nausea and vomiting. Endocrine: Negative for cold intolerance and heat intolerance. Genitourinary: Negative for dysuria and hematuria. C/o erectile dysfunction   Musculoskeletal: Negative for arthralgias and myalgias. Skin: Negative for pallor and rash. Neurological: Negative for dizziness, seizures, syncope, weakness and light-headedness. Hematological: Does not bruise/bleed easily. Psychiatric/Behavioral: Negative for agitation. The patient is not nervous/anxious. Objective  Vital Signs - /80   Pulse 87   Ht 5' 10\" (1.778 m)   Wt 227 lb (103 kg)   BMI 32.57 kg/m²    Wt Readings from Last 3 Encounters:   02/28/20 227 lb (103 kg)   12/30/19 222 lb (100.7 kg)   11/17/19 209 lb 2 oz (94.9 kg)      Physical Exam  Vitals signs and nursing note reviewed. Constitutional:       General: He is not in acute distress. Appearance: Normal appearance. He is well-developed. He is not diaphoretic. HENT:      Head: Normocephalic and atraumatic. Right Ear: Hearing and external ear normal.      Left Ear: Hearing and external ear normal.      Nose: Nose normal.   Eyes:      General:         Right eye: No discharge. Left eye: No discharge. Pupils: Pupils are equal, round, and reactive to light. Neck:      Musculoskeletal: Neck supple. No muscular tenderness. Thyroid: No thyromegaly. Vascular: No carotid bruit or JVD. Trachea: No tracheal deviation. Cardiovascular:      Rate and Rhythm: Normal rate and regular rhythm. Heart sounds: Normal heart sounds. No murmur. No friction rub. No gallop.        Comments: No carotid bruit  Right radial heart cath site healed without sign of infection  Pulmonary: Effort: Pulmonary effort is normal. No respiratory distress. Breath sounds: Normal breath sounds. No wheezing or rales. Abdominal:      Palpations: Abdomen is soft. Tenderness: There is no abdominal tenderness. Musculoskeletal:         General: No swelling or deformity. Comments: Normal gait and station   Skin:     General: Skin is warm and dry. Findings: No rash. Neurological:      General: No focal deficit present. Mental Status: He is alert and oriented to person, place, and time. Cranial Nerves: No cranial nerve deficit. Psychiatric:         Mood and Affect: Mood normal.         Behavior: Behavior normal.         Judgment: Judgment normal.       Data:  Echo 11/19  Findings      Mitral Valve   Mitral valve leaflets are mildly thickened with preserved leaflet   mobility. Trace mitral regurgitation. Aortic Valve   Mildly thickened aortic valve leaflets with preserved leaflet mobility. Aortic valve appears to be tricuspid. No evidence of aortic stenosis or aortic regurgitation. Tricuspid Valve   Trace tricuspid regurgitation . Pulmonic Valve   Pulmonic valve is structurally normal.      Left Atrium   Mildly dilated left atrium. Left Ventricle   Left ventricular size is mildly increased . Mild concentric left ventricular hypertrophy. Mild to moderately decreased ejection fraction. Left ventricular ejection fraction is estimated at 35%. Right Atrium   Normal right atrial dimension with no evidence of thrombus or mass noted. Right Ventricle   Normal right ventricular size with preserved RV function. Pericardial Effusion   No evidence of significant pericardial effusion is noted. Pleural Effusion   No evidence of pleural effusion. Miscellaneous   Aortic root dimension within normal limits.    IVC normal.    Echo 2/18/19  Summary   Normal left ventricular size with decreased LV function and an estimated   ejection fraction of regimen    Nicotine dependence- in early remission. Patient remains smoke-free. Praised him for his efforts and encouraged continued cessation    Erectile dysfunction-likely related to beta-blocker. We discussed the possibility of Bystolic, however patient currently does not have insurance and this would likely be costly. He is hoping to get Medicare in the near future    Stable cardiovascular status. No evidence of overt heart failure,angina or dysrhythmia. Plan    Return in about 3 months (around 5/28/2020) for ENDER. Good job not smoking. Stay smoke free! Call the [address] [place] [address] [phone]  Lasix 20mg daily as needed for weight gain  Increase Atorvastatin to 80mg    - Weigh daily and report weight gain of 3lbs or more in 24hrs or 5lbs in one week. - Call for increasing shortness of breath or increasing swelling in feet and legs. (This could mean you are retaining too much fluid)  - 2000mg low sodium diet  - Fluid restriction of 1500ml per day (about 6 cups of fluid per day)      Call with any questionsor concerns  Follow up with No primary care provider on file. for non cardiac problems  Report any new problems  Cardiovascular Fitness-Exercise as tolerated. Strive for 15 minutes of exercise most days of the week. Cardiac / HealthyDiet  Continue current medications as directed  Continue plan of treatment  It is always recommended that you bring your medicationsbottles with you to each visit - this is for your safety!        [address]

## 2020-02-28 NOTE — PATIENT INSTRUCTIONS
Return in about 3 months (around 5/28/2020) for APRN. Good job not smoking. Stay smoke free! Call the [employer] [phone]  Lasix 20mg daily as needed for weight gain  Increase Atorvastatin to 80mg  May take antihistamines for sinus congestion/ allergies such as Zyrtec, Claritin, Allegra, and Benadryl. Avoid decongestants such as Sudafed that may elevate blood pressure and heart rate. - Weigh daily and report weight gain of 3lbs or more in 24hrs or 5lbs in one week. - Call for increasing shortness of breath or increasing swelling in feet and legs. (This could mean you are retaining too much fluid)  - 2000mg low sodium diet  - Fluid restriction of 1500ml per day (about 6 cups of fluid per day)    Patient Education        Heart Failure: Care Instructions  Your Care Instructions    Heart failure occurs when your heart does not pump as much blood as the body needs. Failure does not mean that the heart has stopped pumping but rather that it is not pumping as well as it should. Over time, this causes fluid buildup in your lungs and other parts of your body. Fluid buildup can cause shortness of breath, fatigue, swollen ankles, and other problems. By taking medicines regularly, reducing sodium (salt) in your diet, checking your weight every day, and making lifestyle changes, you can feel better and live longer. Follow-up care is a key part of your treatment and safety. Be sure to make and go to all appointments, and call your doctor if you are having problems. It's also a good idea to know your test results and keep a list of the medicines you take. How can you care for yourself at home? Medicines    · Be safe with medicines. Take your medicines exactly as prescribed.  Call your doctor if you think you are having a problem with your medicine.     · Do not take any vitamins, over-the-counter medicine, or herbal products without talking to your doctor first. Janusz Rizo not take ibuprofen (Advil or Motrin) and naproxen (Aleve) without talking to your doctor first. They could make your heart failure worse.     · You may take some of the following medicine. ? Angiotensin-converting enzyme inhibitors (ACEIs) or angiotensin II receptor blockers (ARBs) reduce the heart's workload, lower blood pressure, and reduce swelling. Taking an ACEI or ARB may lower your chance of needing to be hospitalized. ? Beta-blockers can slow heart rate, decrease blood pressure, and improve your condition. Taking a beta-blocker may lower your chance of needing to be hospitalized. ? Diuretics, also called water pills, reduce swelling.    You will get more details on the specific medicines your doctor prescribes. Diet    · Your doctor may suggest that you limit sodium. Your doctor can tell you how much sodium is right for you. An example is less than 3,000 mg a day. This includes all the salt you eat in cooking or in packaged foods. People get most of their sodium from processed foods. Fast food and restaurant meals also tend to be very high in sodium.     · Ask your doctor how much liquid you can drink each day. You may have to limit liquids.    Weight    · Weigh yourself without clothing at the same time each day. Record your weight. Call your doctor if you have a sudden weight gain, such as more than 2 to 3 pounds in a day or 5 pounds in a week. (Your doctor may suggest a different range of weight gain.) A sudden weight gain may mean that your heart failure is getting worse.    Activity level    · Start light exercise (if your doctor says it is okay). Even if you can only do a small amount, exercise will help you get stronger, have more energy, and manage your weight and your stress. Walking is an easy way to get exercise. Start out by walking a little more than you did before. Bit by bit, increase the amount you walk.     · When you exercise, watch for signs that your heart is working too hard.  You are pushing yourself too hard if you

## 2020-04-13 RX ORDER — CARVEDILOL 6.25 MG/1
TABLET ORAL
Qty: 180 TABLET | Refills: 3 | Status: SHIPPED | OUTPATIENT
Start: 2020-04-13 | End: 2021-03-01 | Stop reason: SDUPTHER

## 2020-04-13 RX ORDER — CLOPIDOGREL BISULFATE 75 MG/1
TABLET ORAL
Qty: 90 TABLET | Refills: 3 | Status: SHIPPED | OUTPATIENT
Start: 2020-04-13 | End: 2021-04-13

## 2020-04-13 RX ORDER — ATORVASTATIN CALCIUM 40 MG/1
TABLET, FILM COATED ORAL
Qty: 90 TABLET | Refills: 3 | Status: SHIPPED | OUTPATIENT
Start: 2020-04-13 | End: 2020-05-28

## 2020-04-13 RX ORDER — LISINOPRIL 5 MG/1
TABLET ORAL
Qty: 90 TABLET | Refills: 3 | Status: SHIPPED | OUTPATIENT
Start: 2020-04-13 | End: 2020-11-02 | Stop reason: SINTOL

## 2020-05-28 ENCOUNTER — OFFICE VISIT (OUTPATIENT)
Dept: CARDIOLOGY | Age: 45
End: 2020-05-28

## 2020-05-28 VITALS
SYSTOLIC BLOOD PRESSURE: 108 MMHG | DIASTOLIC BLOOD PRESSURE: 72 MMHG | WEIGHT: 238 LBS | HEART RATE: 97 BPM | BODY MASS INDEX: 34.07 KG/M2 | HEIGHT: 70 IN

## 2020-05-28 PROCEDURE — 99214 OFFICE O/P EST MOD 30 MIN: CPT | Performed by: CLINICAL NURSE SPECIALIST

## 2020-05-28 RX ORDER — ATORVASTATIN CALCIUM 80 MG/1
80 TABLET, FILM COATED ORAL DAILY
Qty: 90 TABLET | Refills: 3 | Status: SHIPPED | OUTPATIENT
Start: 2020-05-28 | End: 2020-07-08

## 2020-05-28 RX ORDER — ISOSORBIDE MONONITRATE 30 MG/1
30 TABLET, EXTENDED RELEASE ORAL DAILY
Qty: 30 TABLET | Refills: 5 | Status: SHIPPED | OUTPATIENT
Start: 2020-05-28 | End: 2020-11-16

## 2020-05-28 ASSESSMENT — ENCOUNTER SYMPTOMS
EYE REDNESS: 0
VOMITING: 0
COUGH: 0
ABDOMINAL PAIN: 0
FACIAL SWELLING: 0
NAUSEA: 0
CHEST TIGHTNESS: 1
SHORTNESS OF BREATH: 1
WHEEZING: 0

## 2020-05-28 NOTE — PATIENT INSTRUCTIONS
Return in about 1 month (around 6/28/2020) for APRN. Good job not smoking. Stay smoke free! Lasix 20mg daily as needed for weight gain  Increase Atorvastatin to 80mg as instructed last visit  Come fasting and we will do labs at next vist  Add Isosorbide 30mg daily    - Weigh daily and report weight gain of 3lbs or more in 24hrs or 5lbs in one week. - Call for increasing shortness of breath or increasing swelling in feet and legs.     (This could mean you are retaining too much fluid)  - 2000mg low sodium diet  - Fluid restriction of 1500ml per day (about 6 cups of fluid per day)

## 2020-05-28 NOTE — PROGRESS NOTES
[facility], [address], [address] [address]  Phone: [phone]  Fax: [phone]    OFFICE VISIT:  2020    Vazquez Olvera - : 1975    Reason For Visit:  Afshan Cortez is a 39 y.o. male who is here for Coronary Artery Disease (No cardiac sx today. ) and Hyperlipidemia       Diagnosis Orders   1. Mixed hyperlipidemia  atorvastatin (LIPITOR) 80 MG tablet     HPI  Patient is here for follow-up with a history of CAD. He had his first MI around  with 3 stents. He presented to [facility] in 2019 with chest pain and dyspnea. He was found to have a depressed EF of 35% and was started on guideline directed medical therapy. He had some moderate occlusion and did not require PCI. He had a repeat echo in February that showed improvement in his EF to 40%. He denies any unusual dyspnea, orthopnea, PND, edema,  palpitations. He states he is having some chest discomfort that he describes as a heaviness when he sits down in the evening after working throughout the day. This happens most days of the week and has been going on about 2 weeks now. He has some associated dyspnea. He has gained 11 pounds over the last 3 months since he has been here. He states he does weigh on a regular basis, but weight gain has not been sudden but rather gradual.  He has Lasix to use on an as-needed basis and he is only used it a handful of times he states. He denies any other signs of fluid overload    He currently does not have health insurance and is awaiting coverage under Medicare which is upcoming within the next few months    No primary care provider on file. is PCP.   Vazquez Olvera has the following history as recorded in [facility]:    Patient Active Problem List    Diagnosis Date Noted    Cigarette nicotine dependence without complication     Chronic systolic congestive heart failure ([facility] 2019    Essential hypertension 2016    Coronary Allergies: Patient has no known allergies. Review of Systems  Review of Systems   Constitutional: Negative for activity change, diaphoresis, fatigue, fever and unexpected weight change. HENT: Negative for facial swelling and nosebleeds. Eyes: Negative for redness and visual disturbance. Respiratory: Positive for chest tightness and shortness of breath. Negative for cough and wheezing. Cardiovascular: Negative for chest pain, palpitations and leg swelling. Gastrointestinal: Negative for abdominal pain, nausea and vomiting. Endocrine: Negative for cold intolerance and heat intolerance. Genitourinary: Negative for dysuria and hematuria. Musculoskeletal: Negative for arthralgias and myalgias. Skin: Negative for pallor and rash. Neurological: Negative for dizziness, seizures, syncope, weakness and light-headedness. Hematological: Does not bruise/bleed easily. Psychiatric/Behavioral: Negative for agitation. The patient is not nervous/anxious. Objective  Vital Signs - /72   Pulse 97   Ht 5' 10\" (1.778 m)   Wt 238 lb (108 kg)   BMI 34.15 kg/m²    Wt Readings from Last 3 Encounters:   05/28/20 238 lb (108 kg)   02/28/20 227 lb (103 kg)   12/30/19 222 lb (100.7 kg)      BP Readings from Last 3 Encounters:   05/28/20 108/72   02/28/20 118/80   12/30/19 112/80       BP Readings from Last 3 Encounters:   05/28/20 108/72   02/28/20 118/80   12/30/19 112/80    Pulse Readings from Last 3 Encounters:   05/28/20 97   02/28/20 87   12/30/19 70        Physical Exam  Vitals signs and nursing note reviewed. Constitutional:       General: He is not in acute distress. Appearance: Normal appearance. He is well-developed. He is not diaphoretic. HENT:      Head: Normocephalic and atraumatic. Right Ear: Hearing and external ear normal.      Left Ear: Hearing and external ear normal.      Nose: Nose normal.   Eyes:      General:         Right eye: No discharge.          Left eye: No discharge. Pupils: Pupils are equal, round, and reactive to light. Neck:      Musculoskeletal: Neck supple. No muscular tenderness. Thyroid: No thyromegaly. Vascular: No carotid bruit or JVD. Trachea: No tracheal deviation. Cardiovascular:      Rate and Rhythm: Normal rate and regular rhythm. Heart sounds: Normal heart sounds. No murmur. No friction rub. No gallop. Comments:     Pulmonary:      Effort: Pulmonary effort is normal. No respiratory distress. Breath sounds: Normal breath sounds. No wheezing or rales. Abdominal:      Palpations: Abdomen is soft. Tenderness: There is no abdominal tenderness. Musculoskeletal:         General: No swelling or deformity. Comments: Normal gait and station   Skin:     General: Skin is warm and dry. Findings: No rash. Neurological:      General: No focal deficit present. Mental Status: He is alert and oriented to person, place, and time. Cranial Nerves: No cranial nerve deficit. Psychiatric:         Mood and Affect: Mood normal.         Behavior: Behavior normal.         Judgment: Judgment normal.       Data:  Echo 11/19  Findings      Mitral Valve   Mitral valve leaflets are mildly thickened with preserved leaflet   mobility. Trace mitral regurgitation. Aortic Valve   Mildly thickened aortic valve leaflets with preserved leaflet mobility. Aortic valve appears to be tricuspid. No evidence of aortic stenosis or aortic regurgitation. Tricuspid Valve   Trace tricuspid regurgitation . Pulmonic Valve   Pulmonic valve is structurally normal.      Left Atrium   Mildly dilated left atrium. Left Ventricle   Left ventricular size is mildly increased . Mild concentric left ventricular hypertrophy. Mild to moderately decreased ejection fraction. Left ventricular ejection fraction is estimated at 35%.       Right Atrium   Normal right atrial dimension with no evidence of thrombus or mass

## 2020-06-09 ENCOUNTER — APPOINTMENT (OUTPATIENT)
Dept: NUCLEAR MEDICINE | Age: 45
End: 2020-06-09
Attending: HOSPITALIST
Payer: MEDICAID

## 2020-06-09 ENCOUNTER — HOSPITAL ENCOUNTER (OUTPATIENT)
Age: 45
Setting detail: OBSERVATION
Discharge: HOME | End: 2020-06-09
Attending: HOSPITALIST | Admitting: INTERNAL MEDICINE
Payer: MEDICAID

## 2020-06-09 ENCOUNTER — APPOINTMENT (OUTPATIENT)
Dept: CARDIAC CATH/INVASIVE PROCEDURES | Age: 45
End: 2020-06-09
Attending: HOSPITALIST
Payer: MEDICAID

## 2020-06-09 VITALS
SYSTOLIC BLOOD PRESSURE: 116 MMHG | HEART RATE: 102 BPM | DIASTOLIC BLOOD PRESSURE: 78 MMHG | TEMPERATURE: 97.2 F | HEIGHT: 70 IN | BODY MASS INDEX: 34.65 KG/M2 | OXYGEN SATURATION: 96 % | RESPIRATION RATE: 18 BRPM | WEIGHT: 242 LBS

## 2020-06-09 PROBLEM — R07.9 CHEST PAIN: Status: ACTIVE | Noted: 2020-06-09

## 2020-06-09 LAB
ANION GAP SERPL CALCULATED.3IONS-SCNC: 13 MMOL/L (ref 7–19)
BUN BLDV-MCNC: 15 MG/DL (ref 6–20)
CALCIUM SERPL-MCNC: 9.3 MG/DL (ref 8.6–10)
CHLORIDE BLD-SCNC: 101 MMOL/L (ref 98–111)
CO2: 23 MMOL/L (ref 22–29)
CREAT SERPL-MCNC: 0.8 MG/DL (ref 0.5–1.2)
GFR NON-AFRICAN AMERICAN: >60
GLUCOSE BLD-MCNC: 119 MG/DL (ref 74–109)
HCT VFR BLD CALC: 41.7 % (ref 42–52)
HEMOGLOBIN: 13.5 G/DL (ref 14–18)
MCH RBC QN AUTO: 28.4 PG (ref 27–31)
MCHC RBC AUTO-ENTMCNC: 32.4 G/DL (ref 33–37)
MCV RBC AUTO: 87.6 FL (ref 80–94)
PDW BLD-RTO: 14.4 % (ref 11.5–14.5)
PLATELET # BLD: 226 K/UL (ref 130–400)
PMV BLD AUTO: 10.6 FL (ref 9.4–12.4)
POTASSIUM REFLEX MAGNESIUM: 4.3 MMOL/L (ref 3.5–5)
RBC # BLD: 4.76 M/UL (ref 4.7–6.1)
SODIUM BLD-SCNC: 137 MMOL/L (ref 136–145)
TROPONIN: <0.01 NG/ML (ref 0–0.03)
WBC # BLD: 11.1 K/UL (ref 4.8–10.8)

## 2020-06-09 PROCEDURE — 3430000000 HC RX DIAGNOSTIC RADIOPHARMACEUTICAL: Performed by: INTERNAL MEDICINE

## 2020-06-09 PROCEDURE — G0378 HOSPITAL OBSERVATION PER HR: HCPCS

## 2020-06-09 PROCEDURE — 2580000003 HC RX 258: Performed by: HOSPITALIST

## 2020-06-09 PROCEDURE — 96372 THER/PROPH/DIAG INJ SC/IM: CPT

## 2020-06-09 PROCEDURE — 80048 BASIC METABOLIC PNL TOTAL CA: CPT

## 2020-06-09 PROCEDURE — G0379 DIRECT REFER HOSPITAL OBSERV: HCPCS

## 2020-06-09 PROCEDURE — 36415 COLL VENOUS BLD VENIPUNCTURE: CPT

## 2020-06-09 PROCEDURE — 84484 ASSAY OF TROPONIN QUANT: CPT

## 2020-06-09 PROCEDURE — A9500 TC99M SESTAMIBI: HCPCS | Performed by: INTERNAL MEDICINE

## 2020-06-09 PROCEDURE — 6370000000 HC RX 637 (ALT 250 FOR IP): Performed by: HOSPITALIST

## 2020-06-09 PROCEDURE — 6360000002 HC RX W HCPCS: Performed by: HOSPITALIST

## 2020-06-09 PROCEDURE — 99243 OFF/OP CNSLTJ NEW/EST LOW 30: CPT | Performed by: INTERNAL MEDICINE

## 2020-06-09 PROCEDURE — 93017 CV STRESS TEST TRACING ONLY: CPT

## 2020-06-09 PROCEDURE — 85027 COMPLETE CBC AUTOMATED: CPT

## 2020-06-09 RX ORDER — ACETAMINOPHEN 650 MG/1
650 SUPPOSITORY RECTAL EVERY 6 HOURS PRN
Status: DISCONTINUED | OUTPATIENT
Start: 2020-06-09 | End: 2020-06-09 | Stop reason: HOSPADM

## 2020-06-09 RX ORDER — ISOSORBIDE MONONITRATE 30 MG/1
30 TABLET, EXTENDED RELEASE ORAL DAILY
Status: DISCONTINUED | OUTPATIENT
Start: 2020-06-09 | End: 2020-06-09 | Stop reason: HOSPADM

## 2020-06-09 RX ORDER — LISINOPRIL 5 MG/1
5 TABLET ORAL DAILY
Status: DISCONTINUED | OUTPATIENT
Start: 2020-06-09 | End: 2020-06-09 | Stop reason: HOSPADM

## 2020-06-09 RX ORDER — POTASSIUM CHLORIDE 7.45 MG/ML
10 INJECTION INTRAVENOUS PRN
Status: DISCONTINUED | OUTPATIENT
Start: 2020-06-09 | End: 2020-06-09 | Stop reason: HOSPADM

## 2020-06-09 RX ORDER — ONDANSETRON 2 MG/ML
4 INJECTION INTRAMUSCULAR; INTRAVENOUS EVERY 6 HOURS PRN
Status: DISCONTINUED | OUTPATIENT
Start: 2020-06-09 | End: 2020-06-09 | Stop reason: HOSPADM

## 2020-06-09 RX ORDER — CARVEDILOL 6.25 MG/1
6.25 TABLET ORAL 2 TIMES DAILY
Status: DISCONTINUED | OUTPATIENT
Start: 2020-06-09 | End: 2020-06-09 | Stop reason: HOSPADM

## 2020-06-09 RX ORDER — NITROGLYCERIN 0.4 MG/1
0.4 TABLET SUBLINGUAL EVERY 5 MIN PRN
Status: DISCONTINUED | OUTPATIENT
Start: 2020-06-09 | End: 2020-06-09 | Stop reason: HOSPADM

## 2020-06-09 RX ORDER — ASPIRIN 81 MG/1
81 TABLET, CHEWABLE ORAL DAILY
Status: DISCONTINUED | OUTPATIENT
Start: 2020-06-09 | End: 2020-06-09 | Stop reason: HOSPADM

## 2020-06-09 RX ORDER — SODIUM CHLORIDE 9 MG/ML
INJECTION, SOLUTION INTRAVENOUS CONTINUOUS
Status: DISCONTINUED | OUTPATIENT
Start: 2020-06-09 | End: 2020-06-09 | Stop reason: HOSPADM

## 2020-06-09 RX ORDER — CLOPIDOGREL BISULFATE 75 MG/1
75 TABLET ORAL DAILY
Status: DISCONTINUED | OUTPATIENT
Start: 2020-06-09 | End: 2020-06-09 | Stop reason: HOSPADM

## 2020-06-09 RX ORDER — DEXTROSE MONOHYDRATE 25 G/50ML
12.5 INJECTION, SOLUTION INTRAVENOUS PRN
Status: DISCONTINUED | OUTPATIENT
Start: 2020-06-09 | End: 2020-06-09

## 2020-06-09 RX ORDER — DEXTROSE MONOHYDRATE 50 MG/ML
100 INJECTION, SOLUTION INTRAVENOUS PRN
Status: DISCONTINUED | OUTPATIENT
Start: 2020-06-09 | End: 2020-06-09

## 2020-06-09 RX ORDER — FAMOTIDINE 20 MG/1
10 TABLET, FILM COATED ORAL DAILY
Status: DISCONTINUED | OUTPATIENT
Start: 2020-06-09 | End: 2020-06-09 | Stop reason: HOSPADM

## 2020-06-09 RX ORDER — ACETAMINOPHEN 325 MG/1
650 TABLET ORAL EVERY 6 HOURS PRN
Status: DISCONTINUED | OUTPATIENT
Start: 2020-06-09 | End: 2020-06-09 | Stop reason: HOSPADM

## 2020-06-09 RX ORDER — SODIUM CHLORIDE 0.9 % (FLUSH) 0.9 %
10 SYRINGE (ML) INJECTION PRN
Status: DISCONTINUED | OUTPATIENT
Start: 2020-06-09 | End: 2020-06-09 | Stop reason: HOSPADM

## 2020-06-09 RX ORDER — NICOTINE POLACRILEX 4 MG
15 LOZENGE BUCCAL PRN
Status: DISCONTINUED | OUTPATIENT
Start: 2020-06-09 | End: 2020-06-09

## 2020-06-09 RX ORDER — SODIUM CHLORIDE 0.9 % (FLUSH) 0.9 %
10 SYRINGE (ML) INJECTION EVERY 12 HOURS SCHEDULED
Status: DISCONTINUED | OUTPATIENT
Start: 2020-06-09 | End: 2020-06-09 | Stop reason: HOSPADM

## 2020-06-09 RX ORDER — ATORVASTATIN CALCIUM 80 MG/1
80 TABLET, FILM COATED ORAL DAILY
Status: DISCONTINUED | OUTPATIENT
Start: 2020-06-09 | End: 2020-06-09 | Stop reason: HOSPADM

## 2020-06-09 RX ORDER — PROMETHAZINE HYDROCHLORIDE 12.5 MG/1
12.5 TABLET ORAL EVERY 6 HOURS PRN
Status: DISCONTINUED | OUTPATIENT
Start: 2020-06-09 | End: 2020-06-09 | Stop reason: HOSPADM

## 2020-06-09 RX ORDER — MAGNESIUM SULFATE IN WATER 40 MG/ML
2 INJECTION, SOLUTION INTRAVENOUS PRN
Status: DISCONTINUED | OUTPATIENT
Start: 2020-06-09 | End: 2020-06-09 | Stop reason: HOSPADM

## 2020-06-09 RX ORDER — POTASSIUM CHLORIDE 20 MEQ/1
40 TABLET, EXTENDED RELEASE ORAL PRN
Status: DISCONTINUED | OUTPATIENT
Start: 2020-06-09 | End: 2020-06-09 | Stop reason: HOSPADM

## 2020-06-09 RX ADMIN — ASPIRIN 81 MG 81 MG: 81 TABLET ORAL at 09:42

## 2020-06-09 RX ADMIN — TETRAKIS(2-METHOXYISOBUTYLISOCYANIDE)COPPER(I) TETRAFLUOROBORATE 30 MILLICURIE: 1 INJECTION, POWDER, LYOPHILIZED, FOR SOLUTION INTRAVENOUS at 14:01

## 2020-06-09 RX ADMIN — ISOSORBIDE MONONITRATE 30 MG: 30 TABLET, EXTENDED RELEASE ORAL at 09:41

## 2020-06-09 RX ADMIN — ATORVASTATIN CALCIUM 80 MG: 80 TABLET, FILM COATED ORAL at 09:42

## 2020-06-09 RX ADMIN — CLOPIDOGREL BISULFATE 75 MG: 75 TABLET ORAL at 09:42

## 2020-06-09 RX ADMIN — ACETAMINOPHEN 650 MG: 325 TABLET, FILM COATED ORAL at 14:07

## 2020-06-09 RX ADMIN — ENOXAPARIN SODIUM 40 MG: 40 INJECTION SUBCUTANEOUS at 17:15

## 2020-06-09 RX ADMIN — CARVEDILOL 6.25 MG: 6.25 TABLET, FILM COATED ORAL at 09:42

## 2020-06-09 RX ADMIN — CARVEDILOL 6.25 MG: 6.25 TABLET, FILM COATED ORAL at 01:40

## 2020-06-09 RX ADMIN — SODIUM CHLORIDE, PRESERVATIVE FREE 10 ML: 5 INJECTION INTRAVENOUS at 09:41

## 2020-06-09 RX ADMIN — SODIUM CHLORIDE: 9 INJECTION, SOLUTION INTRAVENOUS at 09:41

## 2020-06-09 RX ADMIN — LISINOPRIL 5 MG: 5 TABLET ORAL at 09:42

## 2020-06-09 RX ADMIN — TETRAKIS(2-METHOXYISOBUTYLISOCYANIDE)COPPER(I) TETRAFLUOROBORATE 10 MILLICURIE: 1 INJECTION, POWDER, LYOPHILIZED, FOR SOLUTION INTRAVENOUS at 14:01

## 2020-06-09 RX ADMIN — FAMOTIDINE 10 MG: 20 TABLET ORAL at 09:42

## 2020-06-09 ASSESSMENT — ENCOUNTER SYMPTOMS
BLOOD IN STOOL: 0
COUGH: 0
WHEEZING: 0
ABDOMINAL PAIN: 0
ABDOMINAL DISTENTION: 0
BACK PAIN: 0
VOMITING: 0
DIARRHEA: 0
SHORTNESS OF BREATH: 0

## 2020-06-09 ASSESSMENT — PAIN DESCRIPTION - ORIENTATION: ORIENTATION: UPPER

## 2020-06-09 ASSESSMENT — PAIN SCALES - GENERAL
PAINLEVEL_OUTOF10: 3
PAINLEVEL_OUTOF10: 0
PAINLEVEL_OUTOF10: 0

## 2020-06-09 ASSESSMENT — PAIN DESCRIPTION - PAIN TYPE: TYPE: ACUTE PAIN

## 2020-06-09 ASSESSMENT — PAIN DESCRIPTION - LOCATION: LOCATION: HEAD

## 2020-06-09 ASSESSMENT — PAIN DESCRIPTION - DESCRIPTORS: DESCRIPTORS: POUNDING

## 2020-06-09 NOTE — PROGRESS NOTES
Cooper Reyes MD        promethazine (PHENERGAN) tablet 12.5 mg  12.5 mg Oral Q6H PRN Janneth Flores MD        Or    ondansetron (ZOFRAN) injection 4 mg  4 mg Intravenous Q6H PRN Janneth Flores MD        enoxaparin (LOVENOX) injection 40 mg  40 mg Subcutaneous Daily Janneth Flores MD        0.9 % sodium chloride infusion   Intravenous Continuous Janneth Flores MD        potassium chloride (KLOR-CON M) extended release tablet 40 mEq  40 mEq Oral PRN Janneth Flores MD        Or    potassium bicarb-citric acid (EFFER-K) effervescent tablet 40 mEq  40 mEq Oral PRN Janneth Flores MD        Or    potassium chloride 10 mEq/100 mL IVPB (Peripheral Line)  10 mEq Intravenous PRN Janneth MD Mark        potassium chloride 10 mEq/100 mL IVPB (Peripheral Line)  10 mEq Intravenous PRN Janneth MD Mark        magnesium sulfate 2 g in 50 mL IVPB premix  2 g Intravenous PRN Janneth Flores MD            Labs:     Recent Labs     06/09/20  0234   WBC 11.1*   RBC 4.76   HGB 13.5*   HCT 41.7*   MCV 87.6   MCH 28.4   MCHC 32.4*        Recent Labs     06/09/20  0234      K 4.3   ANIONGAP 13      CO2 23   BUN 15   CREATININE 0.8   GLUCOSE 119*   CALCIUM 9.3     No results for input(s): MG, PHOS in the last 72 hours. No results for input(s): AST, ALT, ALB, BILITOT, ALKPHOS, ALB in the last 72 hours. ABGs:No results for input(s): PH, PO2, PCO2, HCO3, BE, O2SAT in the last 72 hours. Troponin T:   Recent Labs     06/09/20  0322   TROPONINI <0.01     INR: No results for input(s): INR in the last 72 hours. Lactic Acid: No results for input(s): LACTA in the last 72 hours.     Objective:   Vitals: /78   Pulse 83   Temp 97.1 °F (36.2 °C) (Temporal)   Resp 16   Ht 5' 10\" (1.778 m)   Wt 242 lb (109.8 kg)   SpO2 94%   BMI 34.72 kg/m²   24HR INTAKE/OUTPUT:  No intake or output data in the 24 hours ending 06/09/20 0800     General appearance: alert and cooperative with exam  HEENT: AT/NC  Lungs: no increased work

## 2020-06-09 NOTE — CONSULTS
[facility]  Cardiology Consult      Requesting MD:  Danielle Alvarado MD   Admit Status:  Observation [104]       History obtained from:   ? Patient  ? Other (specify):     PROBLEM LIST:    Patient Active Problem List    Diagnosis Date Noted    Chest pain 06/09/2020     Priority: Low    Cigarette nicotine dependence without complication 64/29/6634     Priority: Low    Chronic systolic congestive heart failure ([facility] [address] 11/16/2019     Priority: Low    Essential hypertension 09/07/2016     Priority: Low    Coronary artery disease of native artery of native heart with stable angina pectoris ([facility] [address] 12/23/2015     Priority: Low    History of coronary artery stent placement 12/23/2015     Priority: Low     Overview Note:     ESE to LAD on 10/25/15  Balloon angioplasty to 1st diagonal branch      Mixed hyperlipidemia 12/23/2015     Priority: Low     1. Unstable angina with negative troponins, known coronary artery disease with prior MI 2015/2016 with prior PCI to mid LAD/diagonal and mid RCA (patent stents by catheterization 11/18/2019 with mid LAD 50% restenosis), ejection fraction 40-45%. 2. Tobacco use stopped November 2019. PRESENTATION: Emelyn Webb is a 39y.o. year old male who presents with acute chest pain which began at rest which he describes as sharp initially in the left upper shoulder region with recurrent episodes. He then developed pressure-like sensation which have been continuously for over 4 hours before presenting to the emergency room. He described the episode as waves. Troponins were negative. No significant ST-T wave changes noted. Recent cardiac catheterization 11/18/2019 with mid LAD 50% restenosis. Had presented with chest pain at that time. REVIEW OF SYSTEMS:  Review of Systems   Constitutional: Negative for activity change, diaphoresis and fatigue. HENT: Negative for hearing loss, nosebleeds and tinnitus. Eyes: Negative for visual disturbance. Used   Substance and Sexual Activity    Alcohol use: No    Drug use: No    Sexual activity: Not on file   Lifestyle    Physical activity     Days per week: Not on file     Minutes per session: Not on file    Stress: Not on file   Relationships    Social connections     Talks on phone: Not on file     Gets together: Not on file     Attends Sabianism service: Not on file     Active member of club or organization: Not on file     Attends meetings of clubs or organizations: Not on file     Relationship status: Not on file    Intimate partner violence     Fear of current or ex partner: Not on file     Emotionally abused: Not on file     Physically abused: Not on file     Forced sexual activity: Not on file   Other Topics Concern    Not on file   Social History Narrative    Not on file       Family History:       Problem Relation Age of Onset    Cancer Mother     Cancer Father        Home Meds:  Prior to Admission medications    Medication Sig Start Date End Date Taking?  Authorizing Provider   atorvastatin (LIPITOR) 80 MG tablet Take 1 tablet by mouth daily 5/28/20  Yes ENDER Mesa   isosorbide mononitrate (IMDUR) 30 MG extended release tablet Take 1 tablet by mouth daily 5/28/20  Yes ENDER Mesa   clopidogrel (PLAVIX) 75 MG tablet Take 1 tablet by mouth once daily 4/13/20  Yes ENDER Mesa   carvedilol (COREG) 6.25 MG tablet TAKE 1 TABLET BY MOUTH TWICE DAILY WITH MEALS 4/13/20  Yes ENDER Mesa   lisinopril (PRINIVIL;ZESTRIL) 5 MG tablet Take 1 tablet by mouth once daily 4/13/20  Yes ENDER Mesa   furosemide (LASIX) 20 MG tablet Take 1 tablet by mouth as needed (weight gain) 12/30/19  Yes ENDER Mesa   nitroGLYCERIN (NITROSTAT) 0.4 MG SL tablet Place 1 tablet under the tongue every 5 minutes as needed for Chest pain 12/30/19  Yes ENDER Mesa   aspirin 81 MG chewable tablet Take 1 tablet by mouth daily 11/20/19  Yes Siva Bashir MD

## 2020-06-09 NOTE — H&P
[facility] - History & Physical      PCP: No primary care provider on file. Date of Admission: (Not on file)    Date of Service: 6/8/2020    Chief Complaint:  Chest pain     History Of Present Illness: The patient is a 39 y.o. male with PMH of DM, HTN, HLD, CAD s/p stenting who started having Chest pain pressure radiating to left jaw, while he was watching his dad, rated it as 8/10, with SOB, and nausea, in [place] [facility] EKG was -ve, troponin 0, CXR -ve , Dimer -ve,  His LFTs elevated chronic, with NTG his Chest pain went down from 8 to 2, case was discussed with Dr. Bonita Mckeon   Past Medical History:        Diagnosis Date    Hand numbness     Hand tingling     Heart attack ([address]     x 3       Past Surgical History:        Procedure Laterality Date    CARDIAC CATHETERIZATION  10/25/15  [facility]    stent to RCA. Stent to LAD. angioplasaty to diagonal branch of LAD.  KNEE ARTHROSCOPY      LAMINECTOMY      NECK SURGERY      x 2        Home Medications:  Prior to Admission medications    Medication Sig Start Date End Date Taking?  Authorizing Provider   atorvastatin (LIPITOR) 80 MG tablet Take 1 tablet by mouth daily 5/28/20   Yogesh Eye, APRN   isosorbide mononitrate (IMDUR) 30 MG extended release tablet Take 1 tablet by mouth daily 5/28/20   Yogesh Eye, APRN   clopidogrel (PLAVIX) 75 MG tablet Take 1 tablet by mouth once daily 4/13/20   Yogesh Eye, APRN   carvedilol (COREG) 6.25 MG tablet TAKE 1 TABLET BY MOUTH TWICE DAILY WITH MEALS 4/13/20   Yogesh Eye, APRN   lisinopril (PRINIVIL;ZESTRIL) 5 MG tablet Take 1 tablet by mouth once daily 4/13/20   Prentiss Eye, APRN   famotidine (PEPCID) 10 MG tablet Take 10 mg by mouth daily    Historical Provider, MD   furosemide (LASIX) 20 MG tablet Take 1 tablet by mouth as needed (weight gain) 12/30/19   Prentiss Eye, APRN   nitroGLYCERIN (NITROSTAT) 0.4 MG SL tablet Place 1 tablet under the tongue every 5 minutes as needed for Chest pain 12/30/19   ENDER Mcdaniel   aspirin 81 MG chewable tablet Take 1 tablet by mouth daily 11/20/19   Selena Bourne MD   CYCLOBENZAPRINE HCL PO Take 10 mg by mouth nightly    Historical Provider, MD       Allergies:    Patient has no known allergies. Social History:      Tobacco:   reports that he quit smoking about 7 months ago. His smoking use included cigarettes. He has a 15.00 pack-year smoking history. He has never used smokeless tobacco.  Alcohol:   reports no history of alcohol use. Illicit Drugs: no     Family History:      Problem Relation Age of Onset   Ml Griffin Cancer Mother     Cancer Father        Review of Systems:   Constitutional / general:  No fever / chills / sweats  HEENT: No sore throat / hoarseness / vision changes  GI: No nausea / vomiting / abdominal pain / diarrhea / constipation  :  No dysuria / hesitancy / urgency / hematuria   Neuro: No muscle weakness / dysphagia / headache / paresthesias  Musculoskeletal:  No edema / cyanosis / pain  Psychiatric:  No depression / anxiety / insomnia  Skin:  No new rashes / lesions    Physical Examination:        There were no vitals taken for this visit. General appearance: No apparent distress, appears stated age and cooperative. Well developed and well groomed. HEENT: Normal cephalic, atraumatic without obvious deformity. Pupils equal, round, and reactive to light. Extra ocular muscles intact. Conjunctivae/corneas clear. Normal ears and nose. Neck: Supple, with full range of motion. No jugular venous distention. Trachea midline. Thyroid no masses noted. Respiratory: Normal respiratory effort. Clear to auscultation bilaterally, without Rales/Wheezes/Rhonchi. Cardiovascular: Regular rate and rhythm with normal S1/S2 without murmurs, rubs or gallops. Abdomen: Soft, non-tender, non-distended with normal bowel sounds  Musculoskeletal: No clubbing, cyanosis or edema bilaterally.   Full range of motion without deformity in 4

## 2020-06-11 LAB
LV EF: 55 %
LVEF MODALITY: NORMAL

## 2020-06-29 ENCOUNTER — OFFICE VISIT (OUTPATIENT)
Dept: CARDIOLOGY | Age: 45
End: 2020-06-29
Payer: MEDICAID

## 2020-06-29 VITALS
HEIGHT: 70 IN | HEART RATE: 80 BPM | BODY MASS INDEX: 34.07 KG/M2 | SYSTOLIC BLOOD PRESSURE: 106 MMHG | DIASTOLIC BLOOD PRESSURE: 76 MMHG | WEIGHT: 238 LBS

## 2020-06-29 DIAGNOSIS — E78.2 MIXED HYPERLIPIDEMIA: ICD-10-CM

## 2020-06-29 LAB
ALT SERPL-CCNC: 150 U/L (ref 5–41)
AST SERPL-CCNC: 92 U/L (ref 5–40)
CHOLESTEROL, TOTAL: 143 MG/DL (ref 160–199)
HDLC SERPL-MCNC: 37 MG/DL (ref 55–121)
LDL CHOLESTEROL CALCULATED: 89 MG/DL
TRIGL SERPL-MCNC: 84 MG/DL (ref 0–149)

## 2020-06-29 PROCEDURE — 99213 OFFICE O/P EST LOW 20 MIN: CPT | Performed by: CLINICAL NURSE SPECIALIST

## 2020-06-29 ASSESSMENT — ENCOUNTER SYMPTOMS
WHEEZING: 0
FACIAL SWELLING: 0
SHORTNESS OF BREATH: 1
ABDOMINAL PAIN: 0
NAUSEA: 0
VOMITING: 0
COUGH: 0
EYE REDNESS: 0

## 2020-06-29 NOTE — PROGRESS NOTES
Negative for activity change, diaphoresis, fatigue, fever and unexpected weight change. HENT: Negative for facial swelling and nosebleeds. Eyes: Negative for redness and visual disturbance. Respiratory: Positive for chest tightness (improvement, less frequent) and shortness of breath. Negative for cough and wheezing. Cardiovascular: Negative for chest pain, palpitations and leg swelling. Gastrointestinal: Negative for abdominal pain, nausea and vomiting. Endocrine: Negative for cold intolerance and heat intolerance. Genitourinary: Negative for dysuria and hematuria. Musculoskeletal: Negative for arthralgias and myalgias. Skin: Negative for pallor and rash. Neurological: Negative for dizziness, seizures, syncope, weakness and light-headedness. Hematological: Does not bruise/bleed easily. Psychiatric/Behavioral: Negative for agitation. The patient is not nervous/anxious. Objective  Vital Signs - /76   Pulse 80   Ht 5' 10\" (1.778 m)   Wt 238 lb (108 kg)   BMI 34.15 kg/m²    Wt Readings from Last 3 Encounters:   06/29/20 238 lb (108 kg)   06/09/20 242 lb (109.8 kg)   05/28/20 238 lb (108 kg)      BP Readings from Last 3 Encounters:   06/29/20 106/76   06/09/20 116/78   05/28/20 108/72       BP Readings from Last 3 Encounters:   06/29/20 106/76   06/09/20 116/78   05/28/20 108/72    Pulse Readings from Last 3 Encounters:   06/29/20 80   06/09/20 102   05/28/20 97        Physical Exam  Vitals signs and nursing note reviewed. Constitutional:       General: He is not in acute distress. Appearance: Normal appearance. He is well-developed. He is not diaphoretic. HENT:      Head: Normocephalic and atraumatic. Right Ear: Hearing and external ear normal.      Left Ear: Hearing and external ear normal.      Nose: Nose normal.   Eyes:      General:         Right eye: No discharge. Left eye: No discharge. Pupils: Pupils are equal, round, and reactive to light. current medications as directed  Continue plan of treatment  It is always recommended that you bring your medicationsbottles with you to each visit - this is for your safety!        ENDER Allen

## 2020-06-30 ASSESSMENT — ENCOUNTER SYMPTOMS: CHEST TIGHTNESS: 1

## 2020-07-08 ENCOUNTER — TELEPHONE (OUTPATIENT)
Dept: CARDIOLOGY | Age: 45
End: 2020-07-08

## 2020-07-08 ENCOUNTER — OFFICE VISIT (OUTPATIENT)
Dept: CARDIOLOGY | Age: 45
End: 2020-07-08
Payer: MEDICARE

## 2020-07-08 VITALS
WEIGHT: 237 LBS | HEART RATE: 87 BPM | DIASTOLIC BLOOD PRESSURE: 74 MMHG | BODY MASS INDEX: 33.93 KG/M2 | HEIGHT: 70 IN | SYSTOLIC BLOOD PRESSURE: 110 MMHG

## 2020-07-08 PROCEDURE — G8417 CALC BMI ABV UP PARAM F/U: HCPCS | Performed by: CLINICAL NURSE SPECIALIST

## 2020-07-08 PROCEDURE — 1036F TOBACCO NON-USER: CPT | Performed by: CLINICAL NURSE SPECIALIST

## 2020-07-08 PROCEDURE — G8427 DOCREV CUR MEDS BY ELIG CLIN: HCPCS | Performed by: CLINICAL NURSE SPECIALIST

## 2020-07-08 PROCEDURE — 99213 OFFICE O/P EST LOW 20 MIN: CPT | Performed by: CLINICAL NURSE SPECIALIST

## 2020-07-08 RX ORDER — ATORVASTATIN CALCIUM 80 MG/1
40 TABLET, FILM COATED ORAL DAILY
Qty: 90 TABLET | Refills: 3
Start: 2020-07-08 | End: 2021-07-06

## 2020-07-08 ASSESSMENT — ENCOUNTER SYMPTOMS
COUGH: 0
WHEEZING: 0
NAUSEA: 0
VOMITING: 0
ABDOMINAL PAIN: 0
CHEST TIGHTNESS: 1
FACIAL SWELLING: 0
SHORTNESS OF BREATH: 1
EYE REDNESS: 0

## 2020-07-08 NOTE — PROGRESS NOTES
[facility], [address], [address] [address]  Phone: [phone]  Fax: [phone]    OFFICE VISIT:  2020    Surya Kaba - : 1975    Reason For Visit:  Karen Schwartz is a 39 y.o. male who is here for Coronary Artery Disease (No cardiac sx today. ) and Hyperlipidemia       Diagnosis Orders   1. Mixed hyperlipidemia  atorvastatin (LIPITOR) 80 MG tablet   2. Elevated LFTs     3. Coronary artery disease of native artery of native heart with stable angina pectoris ([facility] [address]     4. Essential hypertension     5. Chronic systolic congestive heart failure ([facility] [address]     6. Cigarette nicotine dependence in remission       HPI  Patient is here for follow-up with a history of CAD. He had his first MI around  with 3 stents. He presented to [facility] in 2019 with chest pain and dyspnea. He was found to have a depressed EF of 35% and was started on guideline directed medical therapy. He had some moderate occlusion and did not require PCI. He had a repeat echo in February that showed improvement in his EF to 40%. He is continued to have some chest discomfort and we started isosorbide which is helping his symptoms. At a recent visit, he checked his cholesterol after his atorvastatin had been increased to 80 mg. His liver enzymes elevated at this dose. He states he has had elevated liver enzymes in the past.  We suggested switching to Repatha to get his cholesterol to goal.  He wanted to come into the office to discuss. SAMARIA Teran is PCP.   Surya Kaba has the following history as [facility] in [facility]:    Patient Active Problem List    Diagnosis Date Noted    Chest pain 2020    Cigarette nicotine dependence without complication     Chronic systolic congestive heart failure ([facility] [address] 2019    Essential hypertension 2016    Coronary artery disease of native artery of native heart with stable angina pectoris ([address] 2015    History of coronary artery stent placement 2015    Mixed hyperlipidemia 2015     Past Medical History:   Diagnosis Date    CAD (coronary artery disease)     Hand numbness     Hand tingling     Heart attack ([address]     x 3    Hyperlipidemia     Movement disorder      Past Surgical History:   Procedure Laterality Date    CARDIAC CATHETERIZATION  10/25/15  [facility]    stent to RCA. Stent to LAD. angioplasaty to diagonal branch of LAD.  KNEE ARTHROSCOPY      LAMINECTOMY      NECK SURGERY      x 2      Family History   Problem Relation Age of Onset    Cancer Mother     Cancer Father      Social History     Tobacco Use    Smoking status: Former Smoker     Packs/day: 0.50     Years: 30.00     Pack years: 15.00     Types: Cigarettes     Last attempt to quit: 2019     Years since quittin.6    Smokeless tobacco: Never Used   Substance Use Topics    Alcohol use: No      Current Outpatient Medications   Medication Sig Dispense Refill    atorvastatin (LIPITOR) 80 MG tablet Take 0.5 tablets by mouth daily 90 tablet 3    isosorbide mononitrate (IMDUR) 30 MG extended release tablet Take 1 tablet by mouth daily 30 tablet 5    clopidogrel (PLAVIX) 75 MG tablet Take 1 tablet by mouth once daily 90 tablet 3    carvedilol (COREG) 6.25 MG tablet TAKE 1 TABLET BY MOUTH TWICE DAILY WITH MEALS 180 tablet 3    lisinopril (PRINIVIL;ZESTRIL) 5 MG tablet Take 1 tablet by mouth once daily 90 tablet 3    famotidine (PEPCID) 10 MG tablet Take 10 mg by mouth daily      furosemide (LASIX) 20 MG tablet Take 1 tablet by mouth as needed (weight gain) 30 tablet 3    nitroGLYCERIN (NITROSTAT) 0.4 MG SL tablet Place 1 tablet under the tongue every 5 minutes as needed for Chest pain 25 tablet 3    aspirin 81 MG chewable tablet Take 1 tablet by mouth daily 30 tablet 0    CYCLOBENZAPRINE HCL PO Take 10 mg by mouth nightly       No current facility-administered medications for this visit.       Allergies: Patient has no known allergies. Review of Systems  Review of Systems   Constitutional: Negative for activity change, diaphoresis, fatigue, fever and unexpected weight change. HENT: Negative for facial swelling and nosebleeds. Eyes: Negative for redness and visual disturbance. Respiratory: Positive for chest tightness (improvement, less frequent) and shortness of breath. Negative for cough and wheezing. Cardiovascular: Negative for chest pain, palpitations and leg swelling. Gastrointestinal: Negative for abdominal pain, nausea and vomiting. Endocrine: Negative for cold intolerance and heat intolerance. Genitourinary: Negative for dysuria and hematuria. Musculoskeletal: Negative for arthralgias and myalgias. Skin: Negative for pallor and rash. Neurological: Negative for dizziness, seizures, syncope, weakness and light-headedness. Hematological: Does not bruise/bleed easily. Psychiatric/Behavioral: Negative for agitation. The patient is not nervous/anxious. Objective  Vital Signs - /74   Pulse 87   Ht 5' 10\" (1.778 m)   Wt 237 lb (107.5 kg)   BMI 34.01 kg/m²    Wt Readings from Last 3 Encounters:   07/08/20 237 lb (107.5 kg)   06/29/20 238 lb (108 kg)   06/09/20 242 lb (109.8 kg)      BP Readings from Last 3 Encounters:   07/08/20 110/74   06/29/20 106/76   06/09/20 116/78       BP Readings from Last 3 Encounters:   07/08/20 110/74   06/29/20 106/76   06/09/20 116/78    Pulse Readings from Last 3 Encounters:   07/08/20 87   06/29/20 80   06/09/20 102        Physical Exam  Vitals signs and nursing note reviewed. Constitutional:       General: He is not in acute distress. Appearance: Normal appearance. He is well-developed. He is not diaphoretic. HENT:      Head: Normocephalic and atraumatic. Right Ear: Hearing and external ear normal.      Left Ear: Hearing and external ear normal.      Nose: Nose normal.   Eyes:      General:         Right eye: No discharge. thrombus or mass noted. Right Ventricle   Normal right ventricular size with preserved RV function. Pericardial Effusion   No evidence of significant pericardial effusion is noted. Pleural Effusion   No evidence of pleural effusion. Miscellaneous   Aortic root dimension within normal limits. IVC normal.    Echo 2/18/19  Summary   Normal left ventricular size with decreased LV function and an estimated   ejection fraction of approximately 40%. Aortic root is within normal limits. The IVC is normal .   Limited study to evaluate heart function. Heart Cath 11/16/19  Conclusions    Intermediate grade disease. Intermediate restenosis and proximal to mid LAD stent. Intermediate grade restenosis in the ostial to proximal 1st diagonal   stent. Patent mid RCA stent. Normal LV ejection fraction. Recommendations    Medical management. Would maintain patient on Plavix going forward. Smoking cessation stressed. Lexiscan 6/20  Impression:   Submaximal stress test with no recurrent symptoms and without   significant EKG changes for ischemia   There is no obvious infarct or ischemia, with a calculated ejection   fraction of 55 %. Suggest: Clinical correlation is advised. Medical management if no   recurrent symptoms   Signed by Dr Yarelis Scherer on 6/10/2020 1:25 PM       Lab Results   Component Value Date    CHOL 143 (L) 06/29/2020    TRIG 84 06/29/2020    HDL 37 (L) 06/29/2020    LDLCALC 89 06/29/2020    LDLDIRECT 151 (H) 10/26/2015     Lab Results   Component Value Date     (H) 06/29/2020    AST 92 (H) 06/29/2020     Assessment:     Diagnosis Orders   1. Mixed hyperlipidemia  atorvastatin (LIPITOR) 80 MG tablet   2. Elevated LFTs     3. Coronary artery disease of native artery of native heart with stable angina pectoris ([address]     4. Essential hypertension     5. Chronic systolic congestive heart failure ([address]     6.  Cigarette nicotine dependence in remission new problems  Cardiovascular Fitness-Exercise as tolerated. Strive for 15 minutes of exercise most days of the week. Cardiac / HealthyDiet  Continue current medications as directed  Continue plan of treatment  It is always recommended that you bring your medicationsbottles with you to each visit - this is for your safety!        ENDER Hancock

## 2020-07-08 NOTE — TELEPHONE ENCOUNTER
Sammy Fraser-can we start the process for Repatha for this patient? He had a problem with elevated liver enzymes when maxing out his statin dosage.

## 2020-07-08 NOTE — PATIENT INSTRUCTIONS
Return for APRN, as scheduled. Decrease Lipitor to 40mg daily  Work on process for [employer] daily and report weight gain of 3lbs or more in 24hrs or 5lbs in one week. - Call for increasing shortness of breath or increasing swelling in feet and legs.     (This could mean you are retaining too much fluid)  - 2000mg low sodium diet  - Fluid restriction of 1500ml per day (about 6 cups of fluid per day)

## 2020-07-09 RX ORDER — EVOLOCUMAB 140 MG/ML
1 INJECTION, SOLUTION SUBCUTANEOUS
Qty: 2 PEN | Refills: 11 | Status: SHIPPED | OUTPATIENT
Start: 2020-07-09 | End: 2021-06-01

## 2020-07-09 NOTE — TELEPHONE ENCOUNTER
Medication sent to pharmacy, PA submitted through [facility], [facility]

## 2020-07-20 ENCOUNTER — TELEPHONE (OUTPATIENT)
Dept: CARDIOLOGY | Age: 45
End: 2020-07-20

## 2020-07-20 NOTE — TELEPHONE ENCOUNTER
Bria- patient called with questions about repatha. He was able to  this months rx and it only cost his $8 copay. However, he received a letter from insurance that this isn't approved and that he may have been able to fill it that one time but won't be able to again. I did let the patient know you submitted a PA on 7/9/20. He has enough medicine for a few weeks. I did advise you were out of the office this week, so he understands that. Can you follow up with him please?

## 2020-07-31 ENCOUNTER — OFFICE VISIT (OUTPATIENT)
Dept: INTERNAL MEDICINE | Age: 45
End: 2020-07-31
Payer: MEDICARE

## 2020-07-31 VITALS
OXYGEN SATURATION: 97 % | HEIGHT: 70 IN | SYSTOLIC BLOOD PRESSURE: 120 MMHG | BODY MASS INDEX: 34.79 KG/M2 | HEART RATE: 106 BPM | DIASTOLIC BLOOD PRESSURE: 80 MMHG | WEIGHT: 243 LBS

## 2020-07-31 PROCEDURE — G8427 DOCREV CUR MEDS BY ELIG CLIN: HCPCS | Performed by: PHYSICIAN ASSISTANT

## 2020-07-31 PROCEDURE — 1036F TOBACCO NON-USER: CPT | Performed by: PHYSICIAN ASSISTANT

## 2020-07-31 PROCEDURE — G8417 CALC BMI ABV UP PARAM F/U: HCPCS | Performed by: PHYSICIAN ASSISTANT

## 2020-07-31 PROCEDURE — 69209 REMOVE IMPACTED EAR WAX UNI: CPT | Performed by: PHYSICIAN ASSISTANT

## 2020-07-31 PROCEDURE — 99203 OFFICE O/P NEW LOW 30 MIN: CPT | Performed by: PHYSICIAN ASSISTANT

## 2020-07-31 ASSESSMENT — ENCOUNTER SYMPTOMS
EYE PAIN: 0
COLOR CHANGE: 0
CHEST TIGHTNESS: 0
NAUSEA: 0
CONSTIPATION: 0
COUGH: 0
ABDOMINAL PAIN: 0
BACK PAIN: 0
EYE REDNESS: 0
SORE THROAT: 0
DIARRHEA: 0
SHORTNESS OF BREATH: 0
PHOTOPHOBIA: 0
VOMITING: 0
RHINORRHEA: 0
WHEEZING: 0
SINUS PRESSURE: 0

## 2020-07-31 ASSESSMENT — PATIENT HEALTH QUESTIONNAIRE - PHQ9
2. FEELING DOWN, DEPRESSED OR HOPELESS: 0
1. LITTLE INTEREST OR PLEASURE IN DOING THINGS: 0
SUM OF ALL RESPONSES TO PHQ9 QUESTIONS 1 & 2: 0
SUM OF ALL RESPONSES TO PHQ QUESTIONS 1-9: 0
SUM OF ALL RESPONSES TO PHQ QUESTIONS 1-9: 0

## 2020-07-31 NOTE — PROGRESS NOTES
[facility] INTERNAL MEDICINE  [address] [address]  [address] [address]  Dept: [phone]  Dept Fax: [phone]: [phone]      [facility] INTERNAL MEDICINE  OFFICE NOTE      Chief Complaint   Patient presents with   [facility].     Can't hear out of left ear for a week now        Riley Regalado is a 39y.o. year old male who is seen as a new patient. Patient here as a new patient. Patient was seeing Dr. Elzbieta Tran evidently the practice shutdown. Patient is here to set up primary care with a new provider. Patient states has an extensive history of coronary artery disease. Patient states had his first heart attack about 6 or 7 years ago and has had 4 total heart attacks. Patient's blood pressure seems fairly well controlled on current medication regiment. Patient states that his insurance company had discussed that they may not approve Repatha but patient's cardiologist is working on that. Patient's statins were making liver enzymes elevated. Patient denies any chest pain or shortness of breath. Patient states he did quit smoking finally last November. Patient is followed by cardiology. Patient's new complaint is he is not been able to hear out of his left ear for about a week now. Patient states was going to clean it out and then after that he just could not hear anything out of the left ear. Patient states is been using some Debrox a couple times a day but still having difficulty with hearing. Patient denies any fever or discharge. Patient also has a history of neck surgery which was done by Dr. Zahra Zavaleta, at [facility] in [place]. Patient states he has an anterior C2-C6 fusion and a posterior C2-T1 with rods. Patient continues to follow-up with his neurosurgeon.     Active Ambulatory Problems     Diagnosis Date Noted    Coronary artery disease of native artery of native heart with stable angina pectoris ([facility] [address] 12/23/2015    History of coronary artery stent placement 12/23/2015    Mixed hyperlipidemia 12/23/2015    Essential hypertension 09/07/2016    Chronic systolic congestive heart failure ([facility] [address] 11/16/2019    Cigarette nicotine dependence without complication 44/10/2684    Chest pain 06/09/2020     Resolved Ambulatory Problems     Diagnosis Date Noted    History of smoking 09/07/2016    Ischemic chest pain ([facility] [address] 11/16/2019     Past Medical History:   Diagnosis Date    CAD (coronary artery disease)     Hand numbness     Hand tingling     Heart attack ([facility] [address]     Hyperlipidemia     Movement disorder        Past Medical History:   Diagnosis Date    CAD (coronary artery disease)     Hand numbness     Hand tingling     Heart attack ([facility] [address]     x 3    Hyperlipidemia     Movement disorder        Prior to Visit Medications    Medication Sig Taking?  Authorizing Provider   Evolocumab (REPATHA SURECLICK) 272 MG/ML SOAJ Inject 1 mL into the skin every 14 days Yes ENDER Mills   isosorbide mononitrate (IMDUR) 30 MG extended release tablet Take 1 tablet by mouth daily Yes ENDER Mills   clopidogrel (PLAVIX) 75 MG tablet Take 1 tablet by mouth once daily Yes ENDER Mills   carvedilol (COREG) 6.25 MG tablet TAKE 1 TABLET BY MOUTH TWICE DAILY WITH MEALS Yes ENDER Mills   lisinopril (PRINIVIL;ZESTRIL) 5 MG tablet Take 1 tablet by mouth once daily Yes ENDER Mills   famotidine (PEPCID) 10 MG tablet Take 10 mg by mouth daily Yes Historical Provider, MD   furosemide (LASIX) 20 MG tablet Take 1 tablet by mouth as needed (weight gain) Yes ENDER Mills   nitroGLYCERIN (NITROSTAT) 0.4 MG SL tablet Place 1 tablet under the tongue every 5 minutes as needed for Chest pain Yes ENDER Mills   aspirin 81 MG chewable tablet Take 1 tablet by mouth daily Yes Eduard Knowles MD   CYCLOBENZAPRINE HCL PO Take 10 mg by mouth nightly Yes Historical Provider, MD   atorvastatin (LIPITOR) 80 MG tablet Take 0.5 tablets by mouth daily  Patient not taking: Reported on 2020  ENDER Najera       Past Surgical History:   Procedure Laterality Date    CARDIAC CATHETERIZATION  10/25/15  [facility]    stent to RCA. Stent to LAD. angioplasaty to diagonal branch of LAD.     KNEE ARTHROSCOPY      LAMINECTOMY      NECK SURGERY      x 2        Family History   Problem Relation Age of Onset   Nadya So Cancer Mother     Cancer Father        No Known Allergies    Social History     Socioeconomic History    Marital status:      Spouse name: Not on file    Number of children: Not on file    Years of education: Not on file    Highest education level: Not on file   Occupational History    Not on file   Social Needs    Financial resource strain: Not on file    Food insecurity     Worry: Not on file     Inability: Not on file    Transportation needs     Medical: Not on file     Non-medical: Not on file   Tobacco Use    Smoking status: Former Smoker     Packs/day: 0.50     Years: 30.00     Pack years: 15.00     Types: Cigarettes     Last attempt to quit: 2019     Years since quittin.7    Smokeless tobacco: Never Used   Substance and Sexual Activity    Alcohol use: No    Drug use: No    Sexual activity: Not on file   Lifestyle    Physical activity     Days per week: Not on file     Minutes per session: Not on file    Stress: Not on file   Relationships    Social connections     Talks on phone: Not on file     Gets together: Not on file     Attends [employer] service: Not on file     Active member of club or organization: Not on file     Attends meetings of clubs or organizations: Not on file     Relationship status: Not on file    Intimate partner violence     Fear of current or ex partner: Not on file     Emotionally abused: Not on file     Physically abused: Not on file     Forced sexual activity: Not on file   Other Topics Concern    Not on file   Social History Narrative    Not on file       Review of Systems  Review of Systems   Constitutional: Negative for activity change, appetite change, fatigue and unexpected weight change. HENT: Positive for hearing loss. Negative for ear pain, postnasal drip, rhinorrhea, sinus pressure, sneezing and sore throat. Eyes: Negative for photophobia, pain and redness. Respiratory: Negative for cough, chest tightness, shortness of breath and wheezing. Cardiovascular: Negative for chest pain, palpitations and leg swelling. Gastrointestinal: Negative for abdominal pain, constipation, diarrhea, nausea and vomiting. Genitourinary: Negative for dysuria, frequency, hematuria and urgency. Musculoskeletal: Positive for neck pain. Negative for arthralgias, back pain, gait problem, joint swelling and myalgias. Skin: Negative for color change, pallor and wound. Neurological: Negative for dizziness, speech difficulty, weakness, light-headedness, numbness and headaches. Hematological: Negative for adenopathy. Does not bruise/bleed easily. Psychiatric/Behavioral: Negative for confusion. The patient is not nervous/anxious.             Current Outpatient Medications   Medication Sig Dispense Refill    Evolocumab (REPATHA SURECLICK) 691 MG/ML SOAJ Inject 1 mL into the skin every 14 days 2 pen 11    isosorbide mononitrate (IMDUR) 30 MG extended release tablet Take 1 tablet by mouth daily 30 tablet 5    clopidogrel (PLAVIX) 75 MG tablet Take 1 tablet by mouth once daily 90 tablet 3    carvedilol (COREG) 6.25 MG tablet TAKE 1 TABLET BY MOUTH TWICE DAILY WITH MEALS 180 tablet 3    lisinopril (PRINIVIL;ZESTRIL) 5 MG tablet Take 1 tablet by mouth once daily 90 tablet 3    famotidine (PEPCID) 10 MG tablet Take 10 mg by mouth daily      furosemide (LASIX) 20 MG tablet Take 1 tablet by mouth as needed (weight gain) 30 tablet 3    nitroGLYCERIN (NITROSTAT) 0.4 MG SL tablet Place 1 tablet under the tongue every 5 minutes as needed for Chest pain 25 tablet 3    aspirin 81 MG chewable tablet Take 1 tablet by mouth daily 30 tablet 0    CYCLOBENZAPRINE HCL PO Take 10 mg by mouth nightly      atorvastatin (LIPITOR) 80 MG tablet Take 0.5 tablets by mouth daily (Patient not taking: Reported on 7/31/2020) 90 tablet 3     No current facility-administered medications for this visit. /80   Pulse 106   Ht 5' 10\" (1.778 m)   Wt 243 lb (110.2 kg)   SpO2 97%   BMI 34.87 kg/m²     PHYSICAL EXAM  Physical Exam  Vitals signs and nursing note reviewed. Constitutional:       Appearance: He is well-developed. HENT:      Head: Normocephalic and atraumatic. Right Ear: Tympanic membrane, ear canal and external ear normal.      Left Ear: Tympanic membrane, ear canal and external ear normal. There is impacted cerumen. Nose: Nose normal.   Eyes:      General:         Right eye: No discharge. Left eye: No discharge. Pupils: Pupils are equal, round, and reactive to light. Neck:      Musculoskeletal: Normal range of motion. Trachea: No tracheal deviation. Cardiovascular:      Rate and Rhythm: Normal rate and regular rhythm. Heart sounds: Normal heart sounds. No murmur. No friction rub. No gallop. Pulmonary:      Effort: Pulmonary effort is normal. No respiratory distress. Breath sounds: Normal breath sounds. No wheezing or rales. Chest:      Chest wall: No tenderness. Abdominal:      Palpations: Abdomen is soft. Tenderness: There is no abdominal tenderness. There is no guarding or rebound. Musculoskeletal: Normal range of motion. General: No tenderness or deformity. Lymphadenopathy:      Cervical: No cervical adenopathy. Skin:     General: Skin is warm and dry. Findings: No erythema, lesion or rash. Neurological:      Mental Status: He is alert. Psychiatric:         Mood and Affect: Mood normal. Mood is not anxious or depressed. ASSESSMENT      ICD-10-CM    1.  Routine general medical examination at a health care facility  Z00.00    2. Essential hypertension  I10 CBC Auto Differential   3. Mixed hyperlipidemia  E78.2 Comprehensive Metabolic Panel     Lipid Panel   4. Chronic systolic congestive heart failure (HCC)  I50.22    5. History of coronary artery stent placement  Z95.5    6. Coronary artery disease of native artery of native heart with stable angina pectoris ([facility] [address]  I25.118    7. Impacted cerumen of left ear  H61.22 [place] REMOVAL IMPACTED CERUMEN IRRIGATION/LVG UNILAT       PLAN  Patient did have a cerumen impaction in the left ear. We flushed it and got all the wax out and patient able to hear now and feeling a lot better. Patient tolerated procedure well. Patient's blood pressure seems fairly well controlled on current medication regiment continue as directed. Patient is working with cardiology about getting patient on the correct cholesterol medication. Patient does have history of stent placement and coronary artery disease with history of heart attack. Patient will continue to follow-up with cardiology as directed. Patient will continue to follow-up with his surgeon down in [address] [facility] status post cervical fusion. Patient will follow back up with me with labs in approximately 6 months. Patient follow-up sooner as needed. Orders Placed This Encounter   Procedures    CBC Auto Differential    Comprehensive Metabolic Panel    Lipid Panel    [place] REMOVAL IMPACTED CERUMEN IRRIGATION/LVG UNILAT        Return in about 6 months (around 1/31/2021), or if symptoms worsen or fail to improve, for Follow up with labs. All questions answered. Patient voices understanding and agrees to plans along with risks and benefits of plan. Patient is instructed to continue prior medications, diet, and exercise plans as instructed. Patient agrees to follow up as instructions, sooner if needed, or to go to ER if condition becomes emergent.             Additional Instructions: As always, patient

## 2020-08-31 ENCOUNTER — OFFICE VISIT (OUTPATIENT)
Dept: CARDIOLOGY | Age: 45
End: 2020-08-31
Payer: MEDICARE

## 2020-08-31 VITALS
HEIGHT: 70 IN | BODY MASS INDEX: 34.79 KG/M2 | DIASTOLIC BLOOD PRESSURE: 70 MMHG | WEIGHT: 243 LBS | HEART RATE: 84 BPM | SYSTOLIC BLOOD PRESSURE: 100 MMHG

## 2020-08-31 PROCEDURE — G8417 CALC BMI ABV UP PARAM F/U: HCPCS | Performed by: CLINICAL NURSE SPECIALIST

## 2020-08-31 PROCEDURE — 1036F TOBACCO NON-USER: CPT | Performed by: CLINICAL NURSE SPECIALIST

## 2020-08-31 PROCEDURE — 99213 OFFICE O/P EST LOW 20 MIN: CPT | Performed by: CLINICAL NURSE SPECIALIST

## 2020-08-31 PROCEDURE — G8427 DOCREV CUR MEDS BY ELIG CLIN: HCPCS | Performed by: CLINICAL NURSE SPECIALIST

## 2020-08-31 ASSESSMENT — ENCOUNTER SYMPTOMS
SHORTNESS OF BREATH: 1
ABDOMINAL PAIN: 0
VOMITING: 0
CHEST TIGHTNESS: 1
EYE REDNESS: 0
WHEEZING: 0
FACIAL SWELLING: 0
COUGH: 0
NAUSEA: 0

## 2020-08-31 NOTE — PATIENT INSTRUCTIONS
Return in about 6 months (around 2/28/2021) for APRN. Restart Lipitor to 40mg daily  Continue Repatha  Check cholesterol next week  Consider walking program- build up slowly to 30 min for 5 x week, lower carbohydrate    - Weigh daily and report weight gain of 3lbs or more in 24hrs or 5lbs in one week. - Call for increasing shortness of breath or increasing swelling in feet and legs.     (This could mean you are retaining too much fluid)  - 2000mg low sodium diet  - Fluid restriction of 1500ml per day (about 6 cups of fluid per day)

## 2020-08-31 NOTE — PROGRESS NOTES
[facility], [address], [address]  [address]  Phone: [phone]  Fax: [phone]    OFFICE VISIT:  2020    Drew Millan - : 1975    Reason For Visit:  Shelbie Calloway is a 39 y.o. male who is here for Coronary Artery Disease (No cardiac sx today. ); Hyperlipidemia; and Hypertension       Diagnosis Orders   1. Mixed hyperlipidemia     2. Elevated LFTs     3. Coronary artery disease of native artery of native heart with stable angina pectoris ([facility] [address]     4. Chronic systolic congestive heart failure ([facility] [address]     5. Essential hypertension     6. Cigarette nicotine dependence in remission       HPI  Patient is here for follow-up with a history of CAD. He had his first MI around  with 3 stents. He presented to [facility] in 2019 with chest pain and dyspnea. He was found to have a depressed EF of 35% and was started on guideline directed medical therapy. He had some moderate occlusion and did not require PCI. He had a repeat echo in February that showed improvement in his EF to 40%. He is continued to have some chest discomfort and we started isosorbide which is helping his symptoms. At a recent visit, he checked his cholesterol after his atorvastatin had been increased to 80 mg. His liver enzymes elevated at this dose. He states he has had elevated liver enzymes in the past.  He was instructed at last visit to cut his Lipitor back to 40 mg daily, but instead he discontinued it. He was approved for Repatha, injectable cholesterol medicine. He seems to be tolerating this without difficulty and no adverse side effects    SAMARIA Almazan is PCP.   Drew Millan has the following history as [facility] in [facility]:    Patient Active Problem List    Diagnosis Date Noted    Chest pain 2020    Cigarette nicotine dependence without complication     Chronic systolic congestive heart failure ([facility] [address] 2019    Essential hypertension 2016    Coronary artery disease of native artery of native heart with stable angina pectoris ([facility] [address] 2015    History of coronary artery stent placement 2015    Mixed hyperlipidemia 2015     Past Medical History:   Diagnosis Date    CAD (coronary artery disease)     Hand numbness     Hand tingling     Heart attack ([facility] [address]     x 3    Hyperlipidemia     Movement disorder      Past Surgical History:   Procedure Laterality Date    CARDIAC CATHETERIZATION  10/25/[facility]  [facility]    stent to RCA. Stent to LAD. angioplasaty to diagonal branch of LAD.     KNEE ARTHROSCOPY      LAMINECTOMY      NECK SURGERY      x 2      Family History   Problem Relation Age of Onset    Cancer Mother     Cancer Father      Social History     Tobacco Use    Smoking status: Former Smoker     Packs/day: 0.50     Years: 30.00     Pack years: 15.00     Types: Cigarettes     Last attempt to quit: 2019     Years since quittin.8    Smokeless tobacco: Never Used   Substance Use Topics    Alcohol use: No      Current Outpatient Medications   Medication Sig Dispense Refill    Evolocumab (REPATHA SURECLICK) 440 MG/ML SOAJ Inject 1 mL into the skin every 14 days 2 pen 11    isosorbide mononitrate (IMDUR) 30 MG extended release tablet Take 1 tablet by mouth daily 30 tablet 5    clopidogrel (PLAVIX) 75 MG tablet Take 1 tablet by mouth once daily 90 tablet 3    carvedilol (COREG) 6.25 MG tablet TAKE 1 TABLET BY MOUTH TWICE DAILY WITH MEALS 180 tablet 3    lisinopril (PRINIVIL;ZESTRIL) 5 MG tablet Take 1 tablet by mouth once daily 90 tablet 3    famotidine (PEPCID) 10 MG tablet Take 10 mg by mouth daily      furosemide (LASIX) 20 MG tablet Take 1 tablet by mouth as needed (weight gain) 30 tablet 3    nitroGLYCERIN (NITROSTAT) 0.4 MG SL tablet Place 1 tablet under the tongue every 5 minutes as needed for Chest pain 25 tablet 3    aspirin 81 MG chewable tablet Take 1 tablet by mouth daily 30 tablet 0    CYCLOBENZAPRINE HCL PO Take 10 mg by mouth nightly      atorvastatin (LIPITOR) 80 MG tablet Take 0.5 tablets by mouth daily (Patient not taking: Reported on 8/31/2020) 90 tablet 3     No current facility-administered medications for this visit. Allergies: Patient has no known allergies. Review of Systems  Review of Systems   Constitutional: Negative for activity change, diaphoresis, fatigue, fever and unexpected weight change. HENT: Negative for facial swelling and nosebleeds. Eyes: Negative for redness and visual disturbance. Respiratory: Positive for chest tightness (about once every 1-2 weeks) and shortness of breath. Negative for cough and wheezing. Cardiovascular: Negative for chest pain, palpitations and leg swelling. Gastrointestinal: Negative for abdominal pain, nausea and vomiting. Endocrine: Negative for cold intolerance and heat intolerance. Genitourinary: Negative for dysuria and hematuria. Musculoskeletal: Negative for arthralgias and myalgias. Skin: Negative for pallor and rash. Neurological: Negative for dizziness, seizures, syncope, weakness and light-headedness. Hematological: Does not bruise/bleed easily. Psychiatric/Behavioral: Negative for agitation. The patient is not nervous/anxious. Objective  Vital Signs - /70   Pulse 84   Ht 5' 10\" (1.778 m)   Wt 243 lb (110.2 kg)   BMI 34.87 kg/m²    Wt Readings from Last 3 Encounters:   08/31/20 243 lb (110.2 kg)   07/31/20 243 lb (110.2 kg)   07/08/20 237 lb (107.5 kg)      BP Readings from Last 3 Encounters:   08/31/20 100/70   07/31/20 120/80   07/08/20 110/74       BP Readings from Last 3 Encounters:   08/31/20 100/70   07/31/20 120/80   07/08/20 110/74    Pulse Readings from Last 3 Encounters:   08/31/20 84   07/31/20 106   07/08/20 87        Physical Exam  Vitals signs and nursing note reviewed. Constitutional:       General: He is not in acute distress. Appearance: Normal appearance.  He is well-developed. He is not diaphoretic. HENT:      Head: Normocephalic and atraumatic. Right Ear: Hearing and external ear normal.      Left Ear: Hearing and external ear normal.      Nose: Nose normal.   Eyes:      General:         Right eye: No discharge. Left eye: No discharge. Pupils: Pupils are equal, round, and reactive to light. Neck:      Musculoskeletal: Neck supple. No muscular tenderness. Thyroid: No thyromegaly. Vascular: No carotid bruit or JVD. Trachea: No tracheal deviation. Cardiovascular:      Rate and Rhythm: Normal rate and regular rhythm. Heart sounds: Normal heart sounds. No murmur. No friction rub. No gallop. Comments:     Pulmonary:      Effort: Pulmonary effort is normal. No respiratory distress. Breath sounds: Normal breath sounds. No wheezing or rales. Abdominal:      Palpations: Abdomen is soft. Tenderness: There is no abdominal tenderness. Musculoskeletal:         General: No swelling or deformity. Comments: Normal gait and station   Skin:     General: Skin is warm and dry. Findings: No rash. Neurological:      General: No focal deficit present. Mental Status: He is alert and oriented to person, place, and time. Cranial Nerves: No cranial nerve deficit. Psychiatric:         Mood and Affect: Mood normal.         Behavior: Behavior normal.         Judgment: Judgment normal.       Data:  Echo 11/19  Findings      Mitral Valve   Mitral valve leaflets are mildly thickened with preserved leaflet   mobility. Trace mitral regurgitation. Aortic Valve   Mildly thickened aortic valve leaflets with preserved leaflet mobility. Aortic valve appears to be tricuspid. No evidence of aortic stenosis or aortic regurgitation. Tricuspid Valve   Trace tricuspid regurgitation . Pulmonic Valve   Pulmonic valve is structurally normal.      Left Atrium   Mildly dilated left atrium.       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 Chronic systolic congestive heart failure ([address]     5. Essential hypertension     6. Cigarette nicotine dependence in remission       Hyperlipidemia/elevated LFTs-occurred after increasing Lipitor to 80 mg. He was supposed to decrease the dosage to 40 mg daily, but instead discontinued it completely. I would like him to restart Lipitor 40 mg daily. He has had 3-4 doses of Repatha at this point. He has orders to have his lipids checked with his PCP office. He will get this done in the next week or so. CAD-  status post heart cath 11/19 with no intervention and intermediate restenosis of previous stents. Continue medical management with beta-blocker, ACE inhibitor, statin, Plavix. Dr. Dimple Stallworth recommended Plavix long-term. Improvement of angina symptoms with isosorbide. Patient will call for worsening symptoms and we can consider increasing isosorbide if needed    Chronic systolic heart failure NYHA class II, stage B, EF normalized per cath 11/19- patient appears euvolemic and line directed medical therapy. He has been instructed to weigh daily, report sudden weight gain of 3 pounds or more in 24 hours or 5 pounds in a week, low-sodium diet and fluid restrictions of 48 ounces daily. Lasix 20 mg daily as needed for weight gain or edema. Hypertension-well-controlled on current regimen    Nicotine dependence- in  remission. Patient remains smoke-free. Praised him for his efforts and encouraged continued cessation    Stable cardiovascular status. No evidence of overt heart failure,angina or dysrhythmia. Plan    Return in about 6 months (around 2/28/2021) for APRN.    Restart Lipitor to 40mg daily  Continue Repatha  Check cholesterol next week  Consider walking program- build up slowly to 30 min for 5 x week, lower carbohydrate    - Weigh daily and report weight gain of 3lbs or more in 24hrs or 5lbs in one

## 2020-10-15 DIAGNOSIS — I10 ESSENTIAL HYPERTENSION: ICD-10-CM

## 2020-10-15 DIAGNOSIS — E78.2 MIXED HYPERLIPIDEMIA: ICD-10-CM

## 2020-10-15 LAB
ALBUMIN SERPL-MCNC: 4.1 G/DL (ref 3.5–5.2)
ALP BLD-CCNC: 133 U/L (ref 40–130)
ALT SERPL-CCNC: 44 U/L (ref 5–41)
ANION GAP SERPL CALCULATED.3IONS-SCNC: 9 MMOL/L (ref 7–19)
AST SERPL-CCNC: 20 U/L (ref 5–40)
BASOPHILS ABSOLUTE: 0.1 K/UL (ref 0–0.2)
BASOPHILS RELATIVE PERCENT: 1 % (ref 0–1)
BILIRUB SERPL-MCNC: 0.5 MG/DL (ref 0.2–1.2)
BUN BLDV-MCNC: 11 MG/DL (ref 6–20)
CALCIUM SERPL-MCNC: 9.2 MG/DL (ref 8.6–10)
CHLORIDE BLD-SCNC: 105 MMOL/L (ref 98–111)
CHOLESTEROL, TOTAL: 101 MG/DL (ref 160–199)
CO2: 24 MMOL/L (ref 22–29)
CREAT SERPL-MCNC: 0.9 MG/DL (ref 0.5–1.2)
EOSINOPHILS ABSOLUTE: 0.2 K/UL (ref 0–0.6)
EOSINOPHILS RELATIVE PERCENT: 2.1 % (ref 0–5)
GFR AFRICAN AMERICAN: >59
GFR NON-AFRICAN AMERICAN: >60
GLUCOSE BLD-MCNC: 98 MG/DL (ref 74–109)
HCT VFR BLD CALC: 45.7 % (ref 42–52)
HDLC SERPL-MCNC: 43 MG/DL (ref 55–121)
HEMOGLOBIN: 15 G/DL (ref 14–18)
IMMATURE GRANULOCYTES #: 0 K/UL
LDL CHOLESTEROL CALCULATED: 37 MG/DL
LYMPHOCYTES ABSOLUTE: 2.5 K/UL (ref 1.1–4.5)
LYMPHOCYTES RELATIVE PERCENT: 21.3 % (ref 20–40)
MCH RBC QN AUTO: 29 PG (ref 27–31)
MCHC RBC AUTO-ENTMCNC: 32.8 G/DL (ref 33–37)
MCV RBC AUTO: 88.2 FL (ref 80–94)
MONOCYTES ABSOLUTE: 1 K/UL (ref 0–0.9)
MONOCYTES RELATIVE PERCENT: 8.2 % (ref 0–10)
NEUTROPHILS ABSOLUTE: 7.7 K/UL (ref 1.5–7.5)
NEUTROPHILS RELATIVE PERCENT: 67.1 % (ref 50–65)
PDW BLD-RTO: 14.4 % (ref 11.5–14.5)
PLATELET # BLD: 216 K/UL (ref 130–400)
PMV BLD AUTO: 11.2 FL (ref 9.4–12.4)
POTASSIUM SERPL-SCNC: 4.4 MMOL/L (ref 3.5–5)
RBC # BLD: 5.18 M/UL (ref 4.7–6.1)
SODIUM BLD-SCNC: 138 MMOL/L (ref 136–145)
TOTAL PROTEIN: 7.1 G/DL (ref 6.6–8.7)
TRIGL SERPL-MCNC: 106 MG/DL (ref 0–149)
WBC # BLD: 11.5 K/UL (ref 4.8–10.8)

## 2020-11-02 ENCOUNTER — TELEPHONE (OUTPATIENT)
Dept: CARDIOLOGY | Age: 45
End: 2020-11-02

## 2020-11-02 RX ORDER — LOSARTAN POTASSIUM 50 MG/1
50 TABLET ORAL DAILY
Qty: 90 TABLET | Refills: 3 | Status: SHIPPED | OUTPATIENT
Start: 2020-11-02 | End: 2021-10-06

## 2020-11-02 NOTE — TELEPHONE ENCOUNTER
Called and spoke with patient, advised to stop Lisinopril and start Losartan 50 mg daily. Patient verbally understood and will call into pharmacy.

## 2020-11-02 NOTE — TELEPHONE ENCOUNTER
PT called stating he can't take the cough from the lisinopril anymore and wants med changed. Pls advise.

## 2020-11-16 RX ORDER — ISOSORBIDE MONONITRATE 30 MG/1
TABLET, EXTENDED RELEASE ORAL
Qty: 30 TABLET | Refills: 5 | Status: SHIPPED | OUTPATIENT
Start: 2020-11-16 | End: 2020-11-23

## 2020-11-23 RX ORDER — ISOSORBIDE MONONITRATE 30 MG/1
TABLET, EXTENDED RELEASE ORAL
Qty: 30 TABLET | Refills: 3 | Status: SHIPPED | OUTPATIENT
Start: 2020-11-23 | End: 2021-01-26 | Stop reason: SDUPTHER

## 2021-01-26 RX ORDER — ISOSORBIDE MONONITRATE 30 MG/1
TABLET, EXTENDED RELEASE ORAL
Qty: 90 TABLET | Refills: 3 | Status: SHIPPED | OUTPATIENT
Start: 2021-01-26 | End: 2021-03-01

## 2021-02-03 ENCOUNTER — OFFICE VISIT (OUTPATIENT)
Dept: INTERNAL MEDICINE | Age: 46
End: 2021-02-03
Payer: MEDICARE

## 2021-02-03 VITALS
DIASTOLIC BLOOD PRESSURE: 81 MMHG | BODY MASS INDEX: 35.22 KG/M2 | SYSTOLIC BLOOD PRESSURE: 115 MMHG | WEIGHT: 246 LBS | HEART RATE: 103 BPM | HEIGHT: 70 IN

## 2021-02-03 DIAGNOSIS — I50.22 CHRONIC SYSTOLIC CONGESTIVE HEART FAILURE (HCC): ICD-10-CM

## 2021-02-03 DIAGNOSIS — I10 ESSENTIAL HYPERTENSION: ICD-10-CM

## 2021-02-03 DIAGNOSIS — I25.118 CORONARY ARTERY DISEASE OF NATIVE ARTERY OF NATIVE HEART WITH STABLE ANGINA PECTORIS (HCC): Primary | ICD-10-CM

## 2021-02-03 DIAGNOSIS — R00.0 TACHYCARDIA: ICD-10-CM

## 2021-02-03 DIAGNOSIS — E78.2 MIXED HYPERLIPIDEMIA: ICD-10-CM

## 2021-02-03 PROBLEM — R07.9 CHEST PAIN: Status: RESOLVED | Noted: 2020-06-09 | Resolved: 2021-02-03

## 2021-02-03 LAB — TSH SERPL DL<=0.05 MIU/L-ACNC: 1.6 UIU/ML (ref 0.27–4.2)

## 2021-02-03 PROCEDURE — G8484 FLU IMMUNIZE NO ADMIN: HCPCS | Performed by: NURSE PRACTITIONER

## 2021-02-03 PROCEDURE — G8427 DOCREV CUR MEDS BY ELIG CLIN: HCPCS | Performed by: NURSE PRACTITIONER

## 2021-02-03 PROCEDURE — G8417 CALC BMI ABV UP PARAM F/U: HCPCS | Performed by: NURSE PRACTITIONER

## 2021-02-03 PROCEDURE — 99214 OFFICE O/P EST MOD 30 MIN: CPT | Performed by: NURSE PRACTITIONER

## 2021-02-03 PROCEDURE — 1036F TOBACCO NON-USER: CPT | Performed by: NURSE PRACTITIONER

## 2021-02-03 ASSESSMENT — ENCOUNTER SYMPTOMS
SHORTNESS OF BREATH: 1
VOMITING: 0
STRIDOR: 0
CONSTIPATION: 0
TROUBLE SWALLOWING: 0
EYE ITCHING: 0
ABDOMINAL PAIN: 0
BLOOD IN STOOL: 0
ABDOMINAL DISTENTION: 0
CHOKING: 0
WHEEZING: 0
DIARRHEA: 0
SORE THROAT: 0
NAUSEA: 0
COUGH: 0
EYE DISCHARGE: 0
COLOR CHANGE: 0
BACK PAIN: 1

## 2021-02-03 ASSESSMENT — PATIENT HEALTH QUESTIONNAIRE - PHQ9
SUM OF ALL RESPONSES TO PHQ QUESTIONS 1-9: 0
2. FEELING DOWN, DEPRESSED OR HOPELESS: 0
SUM OF ALL RESPONSES TO PHQ9 QUESTIONS 1 & 2: 0
SUM OF ALL RESPONSES TO PHQ QUESTIONS 1-9: 0

## 2021-02-03 ASSESSMENT — LIFESTYLE VARIABLES: HOW OFTEN DO YOU HAVE A DRINK CONTAINING ALCOHOL: 0

## 2021-02-03 NOTE — PATIENT INSTRUCTIONS
1.  CAD stable follow-up with cardiology  2. HTN  With tachycardia   Starting tomorrow; Increase carvediolol to 2 of the 6.25 pills twice daily; Take your blood pressure about 90 minutes after you had coreg; If you pressure is below 100 then decrease the losartan with 1/2 of the 50 starting the next     3. CHF chronic   4. Mixed hyperlipidemia\continue with the Lipitor and Repatha  #5 cardiomyopathy currently stable hopefully can get a little better better if we get his heart rate down some  6.   Chronic back pain is followed by his surgeon in [facility]

## 2021-02-03 NOTE — PROGRESS NOTES
[address] [facility]  [address] [address] [address] [place] [address]  Dept: [phone]  Dept Fax: [phone]: [phone]    Earl Minaya (:  1975) is a 42 y.o. male,Established patient, here for evaluation of the following chief complaint(s): Medicare AWV (Patient is here for Medicare Wellness visit.) and Hyperlipidemia (Patient is here for routine follow up visit and review of labs done this am.)      Earl Minaya is a 42 y.o. male who presents today for his medical conditions/complaints as noted below. Earl Minaya is c/sissy Medicare AWV (Patient is here for Medicare Wellness visit.) and Hyperlipidemia (Patient is here for routine follow up visit and review of labs done this am.)        HPI:     HPI   1. Chronic back pain; with peripheral neuropathy;    2. Hyperlipidemia  lipitor 40 mg daily; He is abld to get repatha started and concetta have injection Friday   3. CAD  No CP MILD SOB; Other than with increased exertion  ehis on plavix ; has imdur 30 as well;   4. Hypertension; Stable on cozaar 50   5. Cardiomyopathy; He is on coreg and lasix   6. Tachycardia on his vitals today he is 110. He said that he does wear a Fitbit sometimes at home and even sitting around not resting before bedtime his heart rate is sometimes 120 and above. Chief Complaint   Patient presents with   [facility] AWV     Patient is here for Medicare Wellness visit.  Hyperlipidemia     Patient is here for routine follow up visit and review of labs done this am.       Past Medical History:   Diagnosis Date    CAD (coronary artery disease)     Hand numbness     Hand tingling     Heart attack ([address]     x 3    Hyperlipidemia     Movement disorder       Past Surgical History:   Procedure Laterality Date    CARDIAC CATHETERIZATION  10/25/15  [address]    stent to RCA. Stent to LAD. angioplasaty to diagonal branch of LAD.     KNEE ARTHROSCOPY      LAMINECTOMY  NECK SURGERY      x 2        Vitals 2/3/2021 2020 2020 2020 2020 3/6/1930   SYSTOLIC 595 [phone] 375   DIASTOLIC 81 70 80 74 76 78   Pulse 103 84 106 87 80 102   Temp - - - - - 97.2   Resp - - - - - 18   SpO2 - - 97 - - 96   Weight 246 lb 243 lb 243 lb 237 lb 238 lb -   Height 5' 10\" 5' 10\" 5' 10\" 5' 10\" 5' 10\" -   Body mass index 35.29 kg/m2 34.86 kg/m2 34.86 kg/m2 34 kg/m2 34.15 kg/m2 -   Pain Level - - - - - -   Some recent data might be hidden       Family History   Problem Relation Age of Onset    Cancer Mother     Cancer Father        Social History     Tobacco Use    Smoking status: Former Smoker     Packs/day: 0.50     Years: 30.00     Pack years: 15.00     Types: Cigarettes     Quit date: 2019     Years since quittin.2    Smokeless tobacco: Never Used   Substance Use Topics    Alcohol use: No      Current Outpatient Medications   Medication Sig Dispense Refill    isosorbide mononitrate (IMDUR) 30 MG extended release tablet Take 1 tablet by mouth once daily 90 tablet 3    losartan (COZAAR) 50 MG tablet Take 1 tablet by mouth daily 90 tablet 3    Evolocumab (REPATHA SURECLICK) 006 MG/ML SOAJ Inject 1 mL into the skin every 14 days 2 pen 11    atorvastatin (LIPITOR) 80 MG tablet Take 0.5 tablets by mouth daily 90 tablet 3    clopidogrel (PLAVIX) 75 MG tablet Take 1 tablet by mouth once daily 90 tablet 3    carvedilol (COREG) 6.25 MG tablet TAKE 1 TABLET BY MOUTH TWICE DAILY WITH MEALS 180 tablet 3    famotidine (PEPCID) 10 MG tablet Take 10 mg by mouth daily      furosemide (LASIX) 20 MG tablet Take 1 tablet by mouth as needed (weight gain) 30 tablet 3    nitroGLYCERIN (NITROSTAT) 0.4 MG SL tablet Place 1 tablet under the tongue every 5 minutes as needed for Chest pain 25 tablet 3    aspirin 81 MG chewable tablet Take 1 tablet by mouth daily 30 tablet 0    CYCLOBENZAPRINE HCL PO Take 10 mg by mouth nightly No current facility-administered medications for this visit.       Allergies   Allergen Reactions    Lisinopril      cough       Health Maintenance   Topic Date Due    Hepatitis C screen  1975    HIV screen  01/26/1992    Annual Wellness Visit (AWV)  07/01/2020    DTaP/Tdap/Td vaccine (1 - Tdap) 02/24/2021 (Originally 1/26/1996)    Flu vaccine (1) 02/03/2022 (Originally 9/1/2020)    Pneumococcal 0-64 years Vaccine (1 of 1 - PPSV23) 05/10/2022 (Originally 1/26/1983)    Lipid screen  10/15/2021    Potassium monitoring  10/15/2021    Creatinine monitoring  10/15/2021    Hepatitis A vaccine  Aged Out    Hepatitis B vaccine  Aged Out    Hib vaccine  Aged Out    Meningococcal (ACWY) vaccine  Aged Out       Lab Results   Component Value Date    LABA1C 5.5 11/16/2019     No results found for: PSA, PSADIA  No results found for: TSH]  Lab Results   Component Value Date     10/15/2020    K 4.4 10/15/2020     10/15/2020    CO2 24 10/15/2020    BUN 11 10/15/2020    CREATININE 0.9 10/15/2020    GLUCOSE 98 10/15/2020    CALCIUM 9.2 10/15/2020    PROT 7.1 10/15/2020    LABALBU 4.1 10/15/2020    BILITOT 0.5 10/15/2020    ALKPHOS 133 (H) 10/15/2020    AST 20 10/15/2020    ALT 44 (H) 10/15/2020    LABGLOM >60 10/15/2020    GFRAA >59 10/15/2020     Lab Results   Component Value Date    CHOL 101 (L) 10/15/2020    CHOL 143 (L) 06/29/2020    CHOL 280 (H) 11/16/2019     Lab Results   Component Value Date    TRIG 106 10/15/2020    TRIG 84 06/29/2020    TRIG 204 (H) 11/16/2019     Lab Results   Component Value Date    HDL 43 (L) 10/15/2020    HDL 37 (L) 06/29/2020    HDL 41 (L) 11/16/2019     Lab Results   Component Value Date    LDLCALC 37 10/15/2020    LDLCALC 89 06/29/2020    LDLCALC 198 11/16/2019     Lab Results   Component Value Date     10/15/2020    K 4.4 10/15/2020    K 4.3 06/09/2020     10/15/2020    CO2 24 10/15/2020    BUN 11 10/15/2020    CREATININE 0.9 10/15/2020 GLUCOSE 98 10/15/2020    CALCIUM 9.2 10/15/2020      Lab Results   Component Value Date    WBC 11.5 (H) 10/15/2020    HGB 15.0 10/15/2020    HCT 45.7 10/15/2020    MCV 88.2 10/15/2020     10/15/2020    LABLYMP 3.19 10/26/2015    LYMPHOPCT 21.3 10/15/2020    RBC 5.18 10/15/2020    MCH 29.0 10/15/2020    MCHC 32.8 (L) 10/15/2020    RDW 14.4 10/15/2020     No results found for: VITD25    Subjective:      Review of Systems   Constitutional: Negative for fatigue, fever and unexpected weight change. HENT: Negative for ear discharge, ear pain, mouth sores, sore throat and trouble swallowing. Eyes: Negative for discharge, itching and visual disturbance. Respiratory: Positive for shortness of breath. Negative for cough, choking, wheezing and stridor. Cardiovascular: Positive for palpitations. Negative for chest pain and leg swelling. Gastrointestinal: Negative for abdominal distention, abdominal pain, blood in stool, constipation, diarrhea, nausea and vomiting. Endocrine: Negative for cold intolerance, polydipsia and polyuria. Genitourinary: Negative for difficulty urinating, dysuria, frequency and urgency. Musculoskeletal: Positive for back pain. Negative for arthralgias and gait problem. Skin: Negative for color change and rash. Allergic/Immunologic: Negative for food allergies and immunocompromised state. Neurological: Negative for dizziness, tremors, syncope, speech difficulty, weakness and headaches. Hematological: Negative for adenopathy. Does not bruise/bleed easily. Psychiatric/Behavioral: Negative for confusion and hallucinations. Objective:     Physical Exam  Constitutional:       General: He is not in acute distress. Appearance: He is well-developed. HENT:      Head: Normocephalic and atraumatic. Eyes:      General: No scleral icterus. Right eye: No discharge. Left eye: No discharge. Pupils: Pupils are equal, round, and reactive to light. Neck:      Musculoskeletal: Normal range of motion and neck supple. Thyroid: No thyromegaly. Vascular: No JVD. Cardiovascular:      Rate and Rhythm: Regular rhythm. Tachycardia present. Heart sounds: Normal heart sounds. No murmur. Pulmonary:      Effort: Pulmonary effort is normal. No respiratory distress. Breath sounds: Normal breath sounds. No wheezing or rales. Abdominal:      General: Bowel sounds are normal. There is no distension. Palpations: Abdomen is soft. There is no mass. Tenderness: There is no abdominal tenderness. There is no guarding or rebound. Musculoskeletal: Normal range of motion. General: No tenderness. Skin:     General: Skin is warm and dry. Findings: No erythema or rash. Neurological:      Mental Status: He is alert and oriented to person, place, and time. Cranial Nerves: No cranial nerve deficit. Coordination: Coordination normal.      Deep Tendon Reflexes: Reflexes are normal and symmetric. Reflexes normal.   Psychiatric:         Mood and Affect: Mood is not depressed. Behavior: Behavior normal.         Thought Content: Thought content normal.         Judgment: Judgment normal.       /81   Pulse 103   Ht 5' 10\" (1.778 m)   Wt 246 lb (111.6 kg)   BMI 35.30 kg/m²     Assessment:       Diagnosis Orders   1. Coronary artery disease of native artery of native heart with stable angina pectoris ([address]     2. Essential hypertension  CBC Auto Differential    Comprehensive Metabolic Panel    Vitamin D 25 Hydroxy    Urinalysis Reflex to Culture    TSH without Reflex   3. Chronic systolic congestive heart failure ([address]     4. Mixed hyperlipidemia  Lipid Panel   5. Tachycardia  TSH without Reflex   6.  Body mass index (BMI) 35.0-35.9, adult   Vitamin D 25 Hydroxy     Labs reviewed from October 2020    Plan:

## 2021-02-13 NOTE — TELEPHONE ENCOUNTER
Left voicemail for patient, per approval notice from [facility] dated 08/04/20 the medication should now be approved at the pharmacy. English

## 2021-02-26 ENCOUNTER — TELEPHONE (OUTPATIENT)
Dept: CARDIOLOGY CLINIC | Age: 46
End: 2021-02-26

## 2021-03-01 ENCOUNTER — OFFICE VISIT (OUTPATIENT)
Dept: CARDIOLOGY CLINIC | Age: 46
End: 2021-03-01
Payer: MEDICARE

## 2021-03-01 VITALS
WEIGHT: 245 LBS | BODY MASS INDEX: 36.29 KG/M2 | DIASTOLIC BLOOD PRESSURE: 82 MMHG | HEART RATE: 88 BPM | HEIGHT: 69 IN | SYSTOLIC BLOOD PRESSURE: 128 MMHG

## 2021-03-01 DIAGNOSIS — I25.10 CORONARY ARTERY DISEASE INVOLVING NATIVE CORONARY ARTERY OF NATIVE HEART, ANGINA PRESENCE UNSPECIFIED: Primary | ICD-10-CM

## 2021-03-01 DIAGNOSIS — E78.2 MIXED HYPERLIPIDEMIA: ICD-10-CM

## 2021-03-01 DIAGNOSIS — I50.22 CHRONIC SYSTOLIC CONGESTIVE HEART FAILURE (HCC): ICD-10-CM

## 2021-03-01 DIAGNOSIS — I10 ESSENTIAL HYPERTENSION: ICD-10-CM

## 2021-03-01 PROCEDURE — G8417 CALC BMI ABV UP PARAM F/U: HCPCS | Performed by: NURSE PRACTITIONER

## 2021-03-01 PROCEDURE — 1036F TOBACCO NON-USER: CPT | Performed by: NURSE PRACTITIONER

## 2021-03-01 PROCEDURE — G8484 FLU IMMUNIZE NO ADMIN: HCPCS | Performed by: NURSE PRACTITIONER

## 2021-03-01 PROCEDURE — 99214 OFFICE O/P EST MOD 30 MIN: CPT | Performed by: NURSE PRACTITIONER

## 2021-03-01 PROCEDURE — G8427 DOCREV CUR MEDS BY ELIG CLIN: HCPCS | Performed by: NURSE PRACTITIONER

## 2021-03-01 RX ORDER — CARVEDILOL 12.5 MG/1
12.5 TABLET ORAL 2 TIMES DAILY WITH MEALS
Qty: 180 TABLET | Refills: 3 | Status: SHIPPED | OUTPATIENT
Start: 2021-03-01 | End: 2021-05-12 | Stop reason: SDUPTHER

## 2021-03-01 RX ORDER — ISOSORBIDE MONONITRATE 60 MG/1
TABLET, EXTENDED RELEASE ORAL
Qty: 90 TABLET | Refills: 3 | Status: SHIPPED | OUTPATIENT
Start: 2021-03-01 | End: 2021-05-12 | Stop reason: SDUPTHER

## 2021-03-01 NOTE — PROGRESS NOTES
 Coronary artery disease of native artery of native heart with stable angina pectoris (HCC) I25.118    History of coronary artery stent placement Z95.5    Mixed hyperlipidemia E78.2    Essential hypertension I10    Chronic systolic congestive heart failure (HCC) I50.22    Cigarette nicotine dependence without complication K13.876     Past Medical History:   Diagnosis Date    CAD (coronary artery disease)     Hand numbness     Hand tingling     Heart attack ([address]     x 3    Hyperlipidemia     Movement disorder      Past Surgical History:   Procedure Laterality Date    CARDIAC [facility]  [facility]  [facility]    stent to RCA. Stent to LAD. angioplasaty to diagonal branch of LAD.     KNEE ARTHROSCOPY      LAMINECTOMY      NECK SURGERY      x 2      Family History   Problem Relation Age of Onset    Cancer Mother     Cancer Father      Social History     Tobacco Use    Smoking status: Former Smoker     Packs/day: 0.50     Years: 30.00     Pack years: 15.00     Types: Cigarettes     Quit date: 2019     Years since quittin.3    Smokeless tobacco: Never Used   Substance Use Topics    Alcohol use: No      Current Outpatient Medications   Medication Sig Dispense Refill    isosorbide mononitrate (IMDUR) 30 MG extended release tablet Take 1 tablet by mouth once daily 90 tablet 3    losartan (COZAAR) 50 MG tablet Take 1 tablet by mouth daily 90 tablet 3    Evolocumab (REPATHA SURECLICK) 559 MG/ML SOAJ Inject 1 mL into the skin every 14 days 2 pen 11    atorvastatin (LIPITOR) 80 MG tablet Take 0.5 tablets by mouth daily 90 tablet 3    clopidogrel (PLAVIX) 75 MG tablet Take 1 tablet by mouth once daily 90 tablet 3    carvedilol (COREG) 6.25 MG tablet TAKE 1 TABLET BY MOUTH TWICE DAILY WITH MEALS (Patient taking differently: Take 12.5 mg by mouth 2 times daily (with meals) ) 180 tablet 3    famotidine (PEPCID) 10 MG tablet Take 10 mg by mouth daily  furosemide (LASIX) 20 MG tablet Take 1 tablet by mouth as needed (weight gain) 30 tablet 3    nitroGLYCERIN (NITROSTAT) 0.4 MG SL tablet Place 1 tablet under the tongue every 5 minutes as needed for Chest pain 25 tablet 3    aspirin 81 MG chewable tablet Take 1 tablet by mouth daily 30 tablet 0    CYCLOBENZAPRINE HCL PO Take 10 mg by mouth nightly       No current facility-administered medications for this visit. Allergies: Lisinopril    Review of Systems  Constitutional  no appetite change, or unexpected weight change. No fever, chills or diaphoresis. No significant change in activity level or new onset of fatigue. HEENT  no significant rhinorrhea or epistaxis. No tinnitus or significant hearing loss. Eyes  no sudden vision change or amaurosis. No corneal arcus, xantholasma, subconjunctival hemorrhage or discharge. Respiratory  no significant wheezing, stridor, apnea or cough. No dyspnea on exertion or shortness of air. Cardiovascular  no orthopnea or PND. No sensation of sustained arrythmia. No occurrence of slow heart rate. No palpitations. No claudication. + exertional left chest and shoulder tightness upon exertion. Relief obtained with rest - has not taken NTG sl. Onset 3 months ago. No associated symptoms. Gastrointestinal  no abdominal swelling or pain. No blood in stool. No severe constipation, diarrhea, nausea, or vomiting. Genitourinary  no dysuria, frequency, or urgency. No flank pain or hematuria. Musculoskeletal  no back pain or myalgia. No problems with gait. Extremities - no clubbing, cyanosis or extremity edema. Skin  no color change or rash. No pallor. No new surgical incision. Neurologic  no speech difficulty, facial asymmetry or lateralizing weakness. No seizures, presyncope or syncope. No significant dizziness. Hematologic  no easy bruising or excessive bleeding. Psychiatric  no severe anxiety or insomnia. No confusion. All other review of systems are negative. Objective  Vital Signs - /82   Pulse 88   Ht 5' 9\" (1.753 m)   Wt 245 lb (111.1 kg)   BMI 36.18 kg/m²   General - Artur Jay is alert, cooperative, and pleasant. Well groomed. No acute distress. Body habitus - Body mass index is 36.18 kg/m². HEENT  Head is normocephalic. No circumoral cyanosis. Dentition is normal.  EYES -   Lids normal without ptosis. No discharge, edema or subconjunctival hemorrhage. Neck - Symmetrical without apparent mass or lymphadenopathy. Respiratory - Normal respiratory effort without use of accessory muscles. Ausculatation reveals vesicular breath sounds without crackles, wheezes, rub or rhonchi. Cardiovascular  No jugular venous distention. Auscultation reveals regular rate and rhythm. No audible clicks, gallop or rub. No murmur. No lower extremity varicosities. No carotid bruits. Abdominal -  No visible distention, mass or pulsations. Extremities - No clubbing or cyanosis. No statis dermatitis or ulcers. No edema. Musculoskeletal -   No Osler's nodes. No kyphosis or scoliosis. Gait is even and regular without limp or shuffle. Ambulates without assistance. Skin -  Warm and dry; no rash or pallor. No new surgical wound. Neurological - No focal neurological deficits. Thought processes coherent. No apparent tremor. Oriented to person, place and time. Psychiatric -  Appropriate affect and mood. Data reviewed:  6/9/20 Lexiscan  Impression   Impression:   Submaximal stress test with no recurrent symptoms and without   significant EKG changes for ischemia   There is no obvious infarct or ischemia, with a calculated ejection   fraction of 55 %. Suggest: Clinical correlation is advised.  Medical management if no   recurrent symptoms   Signed by Dr Aditi Navarro on 6/10/2020 1:25 PM     2/18/20 echo  Summary   Normal left ventricular size with decreased LV function and an estimated ejection fraction of approximately 40%. Aortic root is within normal limits. The IVC is normal .   Limited study to evaluate heart function. Signature    ----------------------------------------------------------------   Electronically signed by Dexter Reyes MD(Interpreting   physician) on 02/18/2020 04:57 PM    11/16/19 cath   Intermediate grade disease. Intermediate restenosis and proximal to mid LAD stent. Intermediate grade restenosis in the ostial to proximal 1st diagonal   stent. Patent mid RCA stent. Normal LV ejection fraction. Recommendations    Medical management. Would maintain patient on Plavix going forward. Smoking cessation stressed.     Signatures    ----------------------------------------------------------------   Electronically signed by Waleska Cannon MD(Performing Physician) on   11/19/2019 08:59   ----------------------------------------------------------------    Lab Results   Component Value Date    WBC 11.5 (H) 10/15/2020    HGB 15.0 10/15/2020    HCT 45.7 10/15/2020    MCV 88.2 10/15/2020     10/15/2020     Lab Results   Component Value Date     10/15/2020    K 4.4 10/15/2020     10/15/2020    CO2 24 10/15/2020    BUN 11 10/15/2020    CREATININE 0.9 10/15/2020    GLUCOSE 98 10/15/2020    CALCIUM 9.2 10/15/2020    PROT 7.1 10/15/2020    LABALBU 4.1 10/15/2020    BILITOT 0.5 10/15/2020    ALKPHOS 133 (H) 10/15/2020    AST 20 10/15/2020    ALT 44 (H) 10/15/2020    LABGLOM >60 10/15/2020    GFRAA >59 10/15/2020       Lab Results   Component Value Date    CHOL 101 (L) 10/15/2020    CHOL 143 (L) 06/29/2020    CHOL 280 (H) 11/16/2019     Lab Results   Component Value Date    TRIG 106 10/15/2020    TRIG 84 06/29/2020    TRIG 204 (H) 11/16/2019     Lab Results   Component Value Date    HDL 43 (L) 10/15/2020    HDL 37 (L) 06/29/2020    HDL 41 (L) 11/16/2019     Lab Results   Component Value Date    LDLCALC 37 10/15/2020    [address] 06/29/2020 [facility] 198 11/16/2019     Assessment:     Diagnosis Orders   1. Coronary artery disease involving native coronary artery of native heart, angina presence unspecified     2. Essential hypertension  carvedilol (COREG) 12.5 MG tablet   3. Chronic systolic congestive heart failure (HCC)  carvedilol (COREG) 12.5 MG tablet   4. Mixed hyperlipidemia       Stable CV status without overt heart failure, sensed arrhythmia or angina. CAD - some exertional left chest and shoulder tightness over past 3 months. Medical management includes Losartan, Imdur, Coreg, Plavix and Lipitor. Lexiscan 6/20 negative for ischemia with normal EF. Discussed proceeding with cath. Imdur increased to 60 mg daily. Follow up in one week and will decide at that time whether to proceed with cath. HTN - normotensive on current regimen. Hyperlipidemia - LDL 37 on Lipitor 80 mg daily. Chronic systolic heart failure - NYHA class 1 stage B  Continues on Coreg, Losartan and Lasix. Currently euvolemic. 6/20 Lexiscan showed EF at 55%. Patient is compliant with medication regimen. BP Readings from Last 3 Encounters:   03/01/21 128/82   02/03/21 115/81   08/31/20 100/70    Pulse Readings from Last 3 Encounters:   03/01/21 88   02/03/21 103   08/31/20 84        Wt Readings from Last 3 Encounters:   03/01/21 245 lb (111.1 kg)   02/03/21 246 lb (111.6 kg)   08/31/20 243 lb (110.2 kg)     Plan  Previous cardiac history and records reviewed. Continue current medical management. Increase Imdur to 60 mg daily. Continue other current medications as directed. Continue to follow up with primary care provider for non cardiac medical problems. Call the office with any problems, questions or concerns at [phone]. Cardiology follow up: one week telephone follow up.  6 months Dr. Flores. Educational included in patient instructions. Heart health. [facility] sl.      Sushma More, APRN

## 2021-03-01 NOTE — PATIENT INSTRUCTIONS
New instructions for today:  Start taking isosorbide 30 mg (2) tabs once daily. The new isosorbide prescription if for 60 mg tab (1) daily. If symptoms worsen go to the ED. Patient Instructions:  Continue current medications as prescribed. Always keep a current medication list. Bring your medications to every office visit. Continue to follow up with primary care provider for non cardiac medical problems. Call the office with any problems, questions or concerns at [phone]. If you have been asked to keep a blood pressure log, do so for 2 weeks. Call the office to report readings to the triage nurse at [phone]. Follow up with cardiologist as scheduled. The following educational material has been included in this after visit summary for your review: Life simple 7. Heart health. Life simple 7  1) Manage blood pressure - high blood pressure is a major risk factor for heart disease and stroke. Keeping blood pressure in health range reduces strain on your heart, arteries and kidneys. Blood pressure goal is less than 130/80. 2) Control cholesterol - contributes to plaque, which can clog arteries and lead to heart disease and stroke. When you control your cholesterol you are giving your arteries their best chance to remain clear. It is recommended that you get cholesterol lab work done once a year. 3) Reduce blood sugar - most of the food we eat is turning into glucose or blood sugar that our body uses for energy. Over time, high levels of blood sugar can damage your heart, kidneys, eyes and nerves. 4) Get active - living an active life is one of the most rewarding gifts you can give yourself and those you love. Simply put, daily physical activity increases your length and quality of life. Strive to exercise 15 minutes most days of the week. 5)  Eat better - A healthy diet is one of your best weapons for fighting cardiovascular disease. When you eat a heart healthy diet, you improve your chances for feeling good and staying healthy for life. 6)  Lose weight - when you shed extra fat an unnecessary pounds, you reduce the burden on your hear, lungs, blood vessels and skeleton. You give yourself the gift of active living, you lower your blood pressure and help yourself feel better. 7) Stop smoking - cigarette smokers have a higher risk of developing cardiovascular disease. If  You smoke, quitting is the best thing you can do for your health. Check American Heart Association on line for more information on Life's Simple 7 and tips for healthy living. A Healthy Heart: Care Instructions  Your Care Instructions     Coronary artery disease, also called heart disease, occurs when a substance called plaque builds up in the vessels that supply oxygen-rich blood to your heart muscle. This can narrow the blood vessels and reduce blood flow. A heart attack happens when blood flow is completely blocked. A high-fat diet, smoking, and other factors increase the risk of heart disease. Your doctor has found that you have a chance of having heart disease. You can do lots of things to keep your heart healthy. It may not be easy, but you can change your diet, exercise more, and quit smoking. These steps really work to lower your chance of heart disease. Follow-up care is a key part of your treatment and safety. Be sure to make and go to all appointments, and call your doctor if you are having problems. It's also a good idea to know your test results and keep a list of the medicines you take. How can you care for yourself at home? Diet  · Use less salt when you cook and eat. This helps lower your blood pressure. Taste food before salting. Add only a little salt when you think you need it. With time, your taste buds will adjust to less salt. · Eat fewer snack items, fast foods, canned soups, and other high-salt, high-fat, processed foods. · Read food labels and try to avoid saturated and trans fats. They increase your risk of heart disease by raising cholesterol levels. · Limit the amount of solid fat-butter, margarine, and shortening-you eat. Use olive, peanut, or canola oil when you cook. Bake, broil, and steam foods instead of frying them. · Eat a variety of fruit and vegetables every day. Dark green, deep orange, red, or yellow fruits and vegetables are especially good for you. Examples include spinach, carrots, peaches, and berries. · Foods high in fiber can reduce your cholesterol and provide important vitamins and minerals. High-fiber foods include whole-grain cereals and breads, oatmeal, beans, brown rice, citrus fruits, and apples. · Eat lean proteins. Heart-healthy proteins include seafood, lean meats and poultry, eggs, beans, peas, nuts, seeds, and soy products. · Limit drinks and foods with added sugar. These include candy, desserts, and soda pop. Lifestyle changes  · If your doctor recommends it, get more exercise. Walking is a good choice. Bit by bit, increase the amount you walk every day. Try for at least 30 minutes on most days of the week. You also may want to swim, bike, or do other activities. · Do not smoke. If you need help quitting, talk to your doctor about stop-smoking programs and medicines. These can increase your chances of quitting for good. Quitting smoking may be the most important step you can take to protect your heart. It is never too late to quit. · Limit alcohol to 2 drinks a day for men and 1 drink a day for women. Too much alcohol can cause health problems. · Manage other health problems such as diabetes, high blood pressure, and high cholesterol. If you think you may have a problem with alcohol or drug use, talk to your doctor.   Medicines · Take your medicines exactly as prescribed. Call your doctor if you think you are having a problem with your medicine. · If your doctor recommends aspirin, take the amount directed each day. Make sure you take aspirin and not another kind of pain reliever, such as acetaminophen (Tylenol). When should you call for help? [id number] if you have symptoms of a heart attack. These may include:  · Chest pain or pressure, or a strange feeling in the chest.  · Sweating. · Shortness of breath. · Pain, pressure, or a strange feeling in the back, neck, jaw, or upper belly or in one or both shoulders or arms. · Lightheadedness or sudden weakness. · A fast or irregular heartbeat. After you call 911, the  may tell you to chew 1 adult-strength or 2 to 4 low-dose aspirin. Wait for an ambulance. Do not try to drive yourself. Watch closely for changes in your health, and be sure to contact your doctor if you have any problems. Where can you learn more? Go to https://cooala - your brands.Appform[employer]. org and sign in to your [employer] account. Enter S403 in the [facility] box to learn more about \"A Healthy Heart: Care Instructions. \"     If you do not have an account, please click on the \"Sign Up Now\" link. Current as of: December 16, 2019               Content Version: 12.5  © 7072-0586 Healthwise, Incorporated. Care instructions adapted under license by [facility] ([facility]). If you have questions about a medical condition or this instruction, always ask your healthcare professional. [place] [address] any warranty or liability for your use of this information. How to take:  NITROGLYCERIN (Nitrostat) 0.4 mg tablets, sublingual.  Nitroglycerin is in a group of drugs called nitrates. Nitroglycerin dilates (widens) blood vessels, making it easier for blood to flow through them and easier for the heart to pump.     Dosing Guidelines for Nitroglycerin Tablets · At the start of an angina (chest pain) attack, place one tablet under the tongue or between the cheek and gum. Do not swallow or chew the tablet; let it dissolve on its own. If necessary, a second and third tablet may be used, with five minutes between using each tablet. If you use a third tablet and your chest pain continues, it is time to seek immediate medical attention. Call 911 immediately and have someone drive you to the emergency room. You may be having a heart attack or other serious heart problem. · To prevent angina from exercise or stress, use 1 tablet 5 to 10 minutes before the activity.

## 2021-03-05 ENCOUNTER — HOSPITAL ENCOUNTER (INPATIENT)
Age: 46
LOS: 1 days | Discharge: HOME | DRG: 303 | End: 2021-03-05
Attending: HOSPITALIST | Admitting: HOSPITALIST
Payer: MEDICARE

## 2021-03-05 ENCOUNTER — APPOINTMENT (OUTPATIENT)
Dept: GENERAL RADIOLOGY | Age: 46
DRG: 303 | End: 2021-03-05
Attending: HOSPITALIST
Payer: MEDICARE

## 2021-03-05 ENCOUNTER — APPOINTMENT (OUTPATIENT)
Dept: NUCLEAR MEDICINE | Age: 46
DRG: 303 | End: 2021-03-05
Attending: HOSPITALIST
Payer: MEDICARE

## 2021-03-05 PROBLEM — I25.119 CHEST PAIN DUE TO CAD (HCC): Status: ACTIVE | Noted: 2021-03-05

## 2021-03-05 PROBLEM — I20.0 UNSTABLE ANGINA (HCC): Status: ACTIVE | Noted: 2021-03-05

## 2021-03-05 LAB
ALBUMIN SERPL-MCNC: 4 G/DL (ref 3.5–5.2)
ALP BLD-CCNC: 116 U/L (ref 40–130)
ALT SERPL-CCNC: 59 U/L (ref 5–41)
ANION GAP SERPL CALCULATED.3IONS-SCNC: 13 MMOL/L (ref 7–19)
APTT: 23.7 SEC (ref 26–36.2)
AST SERPL-CCNC: 23 U/L (ref 5–40)
BASOPHILS ABSOLUTE: 0.1 K/UL (ref 0–0.2)
BASOPHILS RELATIVE PERCENT: 0.9 % (ref 0–1)
BILIRUB SERPL-MCNC: <0.2 MG/DL (ref 0.2–1.2)
BUN BLDV-MCNC: 15 MG/DL (ref 6–20)
CALCIUM SERPL-MCNC: 9 MG/DL (ref 8.6–10)
CHLORIDE BLD-SCNC: 103 MMOL/L (ref 98–111)
CHOLESTEROL, TOTAL: 95 MG/DL (ref 160–199)
CO2: 24 MMOL/L (ref 22–29)
CREAT SERPL-MCNC: 1 MG/DL (ref 0.5–1.2)
EOSINOPHILS ABSOLUTE: 0.2 K/UL (ref 0–0.6)
EOSINOPHILS RELATIVE PERCENT: 1.7 % (ref 0–5)
GFR AFRICAN AMERICAN: >59
GFR NON-AFRICAN AMERICAN: >60
GLUCOSE BLD-MCNC: 94 MG/DL (ref 74–109)
HBA1C MFR BLD: 5.7 % (ref 4–6)
HCT VFR BLD CALC: 41.2 % (ref 42–52)
HDLC SERPL-MCNC: 40 MG/DL (ref 55–121)
HEMOGLOBIN: 13.6 G/DL (ref 14–18)
IMMATURE GRANULOCYTES #: 0.1 K/UL
LDL CHOLESTEROL CALCULATED: 27 MG/DL
LV EF: 58 %
LVEF MODALITY: NORMAL
LYMPHOCYTES ABSOLUTE: 2.7 K/UL (ref 1.1–4.5)
LYMPHOCYTES RELATIVE PERCENT: 25.9 % (ref 20–40)
MAGNESIUM: 1.9 MG/DL (ref 1.6–2.6)
MCH RBC QN AUTO: 29.1 PG (ref 27–31)
MCHC RBC AUTO-ENTMCNC: 33 G/DL (ref 33–37)
MCV RBC AUTO: 88 FL (ref 80–94)
MONOCYTES ABSOLUTE: 0.7 K/UL (ref 0–0.9)
MONOCYTES RELATIVE PERCENT: 6.4 % (ref 0–10)
NEUTROPHILS ABSOLUTE: 6.7 K/UL (ref 1.5–7.5)
NEUTROPHILS RELATIVE PERCENT: 64.4 % (ref 50–65)
PDW BLD-RTO: 14.4 % (ref 11.5–14.5)
PHOSPHORUS: 2.8 MG/DL (ref 2.5–4.5)
PLATELET # BLD: 179 K/UL (ref 130–400)
PMV BLD AUTO: 11.4 FL (ref 9.4–12.4)
POTASSIUM REFLEX MAGNESIUM: 3.9 MMOL/L (ref 3.5–5)
RBC # BLD: 4.68 M/UL (ref 4.7–6.1)
SODIUM BLD-SCNC: 140 MMOL/L (ref 136–145)
TOTAL PROTEIN: 6.7 G/DL (ref 6.6–8.7)
TRIGL SERPL-MCNC: 141 MG/DL (ref 0–149)
TROPONIN: <0.01 NG/ML (ref 0–0.03)
TSH REFLEX FT4: 4.02 UIU/ML (ref 0.35–5.5)
WBC # BLD: 10.3 K/UL (ref 4.8–10.8)

## 2021-03-05 PROCEDURE — 80061 LIPID PANEL: CPT

## 2021-03-05 PROCEDURE — 3430000000 HC RX DIAGNOSTIC RADIOPHARMACEUTICAL: Performed by: INTERNAL MEDICINE

## 2021-03-05 PROCEDURE — 2580000003 HC RX 258: Performed by: HOSPITALIST

## 2021-03-05 PROCEDURE — 6370000000 HC RX 637 (ALT 250 FOR IP): Performed by: HOSPITALIST

## 2021-03-05 PROCEDURE — 71045 X-RAY EXAM CHEST 1 VIEW: CPT

## 2021-03-05 PROCEDURE — 84100 ASSAY OF PHOSPHORUS: CPT

## 2021-03-05 PROCEDURE — 84443 ASSAY THYROID STIM HORMONE: CPT

## 2021-03-05 PROCEDURE — 6360000004 HC RX CONTRAST MEDICATION: Performed by: HOSPITALIST

## 2021-03-05 PROCEDURE — 83735 ASSAY OF MAGNESIUM: CPT

## 2021-03-05 PROCEDURE — 84484 ASSAY OF TROPONIN QUANT: CPT

## 2021-03-05 PROCEDURE — A9500 TC99M SESTAMIBI: HCPCS | Performed by: INTERNAL MEDICINE

## 2021-03-05 PROCEDURE — 93017 CV STRESS TEST TRACING ONLY: CPT

## 2021-03-05 PROCEDURE — 83036 HEMOGLOBIN GLYCOSYLATED A1C: CPT

## 2021-03-05 PROCEDURE — C8929 TTE W OR WO FOL WCON,DOPPLER: HCPCS

## 2021-03-05 PROCEDURE — 36415 COLL VENOUS BLD VENIPUNCTURE: CPT

## 2021-03-05 PROCEDURE — 2140000000 HC CCU INTERMEDIATE R&B

## 2021-03-05 PROCEDURE — 6360000002 HC RX W HCPCS: Performed by: HOSPITALIST

## 2021-03-05 PROCEDURE — 85025 COMPLETE CBC W/AUTO DIFF WBC: CPT

## 2021-03-05 PROCEDURE — 85730 THROMBOPLASTIN TIME PARTIAL: CPT

## 2021-03-05 PROCEDURE — 99222 1ST HOSP IP/OBS MODERATE 55: CPT | Performed by: INTERNAL MEDICINE

## 2021-03-05 PROCEDURE — 80053 COMPREHEN METABOLIC PANEL: CPT

## 2021-03-05 RX ORDER — HEPARIN SODIUM 10000 [USP'U]/100ML
9 INJECTION, SOLUTION INTRAVENOUS CONTINUOUS
Status: DISCONTINUED | OUTPATIENT
Start: 2021-03-05 | End: 2021-03-05 | Stop reason: HOSPADM

## 2021-03-05 RX ORDER — ASPIRIN 81 MG/1
81 TABLET, CHEWABLE ORAL DAILY
Status: DISCONTINUED | OUTPATIENT
Start: 2021-03-05 | End: 2021-03-05

## 2021-03-05 RX ORDER — ASPIRIN 81 MG/1
81 TABLET, CHEWABLE ORAL DAILY
Status: DISCONTINUED | OUTPATIENT
Start: 2021-03-05 | End: 2021-03-05 | Stop reason: HOSPADM

## 2021-03-05 RX ORDER — LOSARTAN POTASSIUM 25 MG/1
50 TABLET ORAL DAILY
Status: DISCONTINUED | OUTPATIENT
Start: 2021-03-05 | End: 2021-03-05 | Stop reason: HOSPADM

## 2021-03-05 RX ORDER — NITROGLYCERIN 0.4 MG/1
0.4 TABLET SUBLINGUAL EVERY 5 MIN PRN
Status: DISCONTINUED | OUTPATIENT
Start: 2021-03-05 | End: 2021-03-05 | Stop reason: HOSPADM

## 2021-03-05 RX ORDER — SODIUM CHLORIDE 0.9 % (FLUSH) 0.9 %
10 SYRINGE (ML) INJECTION EVERY 12 HOURS SCHEDULED
Status: DISCONTINUED | OUTPATIENT
Start: 2021-03-05 | End: 2021-03-05 | Stop reason: HOSPADM

## 2021-03-05 RX ORDER — ATORVASTATIN CALCIUM 80 MG/1
80 TABLET, FILM COATED ORAL NIGHTLY
Status: DISCONTINUED | OUTPATIENT
Start: 2021-03-05 | End: 2021-03-05 | Stop reason: HOSPADM

## 2021-03-05 RX ORDER — CLOPIDOGREL BISULFATE 75 MG/1
75 TABLET ORAL DAILY
Status: DISCONTINUED | OUTPATIENT
Start: 2021-03-05 | End: 2021-03-05 | Stop reason: HOSPADM

## 2021-03-05 RX ORDER — HEPARIN SODIUM 1000 [USP'U]/ML
4000 INJECTION, SOLUTION INTRAVENOUS; SUBCUTANEOUS PRN
Status: DISCONTINUED | OUTPATIENT
Start: 2021-03-05 | End: 2021-03-05 | Stop reason: HOSPADM

## 2021-03-05 RX ORDER — ONDANSETRON 2 MG/ML
8 INJECTION INTRAMUSCULAR; INTRAVENOUS ONCE
Status: DISCONTINUED | OUTPATIENT
Start: 2021-03-05 | End: 2021-03-05

## 2021-03-05 RX ORDER — ISOSORBIDE MONONITRATE 60 MG/1
60 TABLET, EXTENDED RELEASE ORAL DAILY
Status: DISCONTINUED | OUTPATIENT
Start: 2021-03-05 | End: 2021-03-05 | Stop reason: HOSPADM

## 2021-03-05 RX ORDER — FAMOTIDINE 20 MG/1
10 TABLET, FILM COATED ORAL NIGHTLY PRN
Status: DISCONTINUED | OUTPATIENT
Start: 2021-03-05 | End: 2021-03-05 | Stop reason: HOSPADM

## 2021-03-05 RX ORDER — POLYETHYLENE GLYCOL 3350 17 G/17G
17 POWDER, FOR SOLUTION ORAL DAILY PRN
Status: DISCONTINUED | OUTPATIENT
Start: 2021-03-05 | End: 2021-03-05 | Stop reason: HOSPADM

## 2021-03-05 RX ORDER — SODIUM CHLORIDE 0.9 % (FLUSH) 0.9 %
10 SYRINGE (ML) INJECTION PRN
Status: DISCONTINUED | OUTPATIENT
Start: 2021-03-05 | End: 2021-03-05 | Stop reason: HOSPADM

## 2021-03-05 RX ORDER — MECOBALAMIN 5000 MCG
5 TABLET,DISINTEGRATING ORAL NIGHTLY PRN
Status: DISCONTINUED | OUTPATIENT
Start: 2021-03-05 | End: 2021-03-05 | Stop reason: HOSPADM

## 2021-03-05 RX ORDER — CYCLOBENZAPRINE HCL 10 MG
10 TABLET ORAL NIGHTLY
Status: DISCONTINUED | OUTPATIENT
Start: 2021-03-05 | End: 2021-03-05 | Stop reason: HOSPADM

## 2021-03-05 RX ORDER — HEPARIN SODIUM 1000 [USP'U]/ML
2000 INJECTION, SOLUTION INTRAVENOUS; SUBCUTANEOUS PRN
Status: DISCONTINUED | OUTPATIENT
Start: 2021-03-05 | End: 2021-03-05 | Stop reason: HOSPADM

## 2021-03-05 RX ORDER — ACETAMINOPHEN 325 MG/1
650 TABLET ORAL EVERY 6 HOURS PRN
Status: DISCONTINUED | OUTPATIENT
Start: 2021-03-05 | End: 2021-03-05 | Stop reason: HOSPADM

## 2021-03-05 RX ORDER — ONDANSETRON 2 MG/ML
4 INJECTION INTRAMUSCULAR; INTRAVENOUS EVERY 6 HOURS PRN
Status: DISCONTINUED | OUTPATIENT
Start: 2021-03-05 | End: 2021-03-05 | Stop reason: HOSPADM

## 2021-03-05 RX ORDER — HEPARIN SODIUM 1000 [USP'U]/ML
4000 INJECTION, SOLUTION INTRAVENOUS; SUBCUTANEOUS ONCE
Status: COMPLETED | OUTPATIENT
Start: 2021-03-05 | End: 2021-03-05

## 2021-03-05 RX ORDER — ACETAMINOPHEN 650 MG/1
650 SUPPOSITORY RECTAL EVERY 6 HOURS PRN
Status: DISCONTINUED | OUTPATIENT
Start: 2021-03-05 | End: 2021-03-05 | Stop reason: HOSPADM

## 2021-03-05 RX ORDER — CYCLOBENZAPRINE HCL 10 MG
10 TABLET ORAL NIGHTLY
Status: DISCONTINUED | OUTPATIENT
Start: 2021-03-05 | End: 2021-03-05

## 2021-03-05 RX ORDER — ONDANSETRON 4 MG/1
4 TABLET, ORALLY DISINTEGRATING ORAL EVERY 8 HOURS PRN
Status: DISCONTINUED | OUTPATIENT
Start: 2021-03-05 | End: 2021-03-05 | Stop reason: HOSPADM

## 2021-03-05 RX ORDER — CARVEDILOL 12.5 MG/1
12.5 TABLET ORAL 2 TIMES DAILY WITH MEALS
Status: DISCONTINUED | OUTPATIENT
Start: 2021-03-05 | End: 2021-03-05 | Stop reason: HOSPADM

## 2021-03-05 RX ADMIN — ISOSORBIDE MONONITRATE 60 MG: 60 TABLET, EXTENDED RELEASE ORAL at 09:12

## 2021-03-05 RX ADMIN — TETRAKIS(2-METHOXYISOBUTYLISOCYANIDE)COPPER(I) TETRAFLUOROBORATE 30 MILLICURIE: 1 INJECTION, POWDER, LYOPHILIZED, FOR SOLUTION INTRAVENOUS at 11:12

## 2021-03-05 RX ADMIN — CLOPIDOGREL BISULFATE 75 MG: 75 TABLET ORAL at 09:12

## 2021-03-05 RX ADMIN — TETRAKIS(2-METHOXYISOBUTYLISOCYANIDE)COPPER(I) TETRAFLUOROBORATE 10 MILLICURIE: 1 INJECTION, POWDER, LYOPHILIZED, FOR SOLUTION INTRAVENOUS at 11:12

## 2021-03-05 RX ADMIN — ASPIRIN 81 MG: 81 TABLET, CHEWABLE ORAL at 09:12

## 2021-03-05 RX ADMIN — HEPARIN SODIUM 9 UNITS/KG/HR: 10000 INJECTION, SOLUTION INTRAVENOUS at 04:27

## 2021-03-05 RX ADMIN — LOSARTAN POTASSIUM 50 MG: 25 TABLET, FILM COATED ORAL at 09:12

## 2021-03-05 RX ADMIN — SODIUM CHLORIDE, PRESERVATIVE FREE 10 ML: 5 INJECTION INTRAVENOUS at 09:14

## 2021-03-05 RX ADMIN — CYCLOBENZAPRINE HYDROCHLORIDE 10 MG: 10 TABLET, FILM COATED ORAL at 05:48

## 2021-03-05 RX ADMIN — HEPARIN SODIUM 4000 UNITS: 1000 INJECTION, SOLUTION INTRAVENOUS; SUBCUTANEOUS at 04:27

## 2021-03-05 RX ADMIN — PERFLUTREN 1.65 MG: 6.52 INJECTION, SUSPENSION INTRAVENOUS at 10:30

## 2021-03-05 ASSESSMENT — ENCOUNTER SYMPTOMS
WHEEZING: 0
DIARRHEA: 0
COUGH: 0
BACK PAIN: 0
VOMITING: 0
SHORTNESS OF BREATH: 0
BLOOD IN STOOL: 0
ABDOMINAL DISTENTION: 0
CHEST TIGHTNESS: 1
ABDOMINAL PAIN: 0

## 2021-03-05 ASSESSMENT — PAIN SCALES - GENERAL: PAINLEVEL_OUTOF10: 0

## 2021-03-05 NOTE — H&P
Patient Information:  Patient: Jose Jensen  MRN: <[id number]>   Acct:  [de-identified]  YOB: 1975  Admit Date: 3/5/2021   Primary Care Physician: SAMARIA Perea  Advance Directive: Full Code   Health Care Proxy: his wife        SUBJECTIVE:    EP Sign Out:  [facility]  Dr. Mali Bowling  Mr. Abigail Logan is a 42year old gent with a several hour history of substernal chest tightness. It has relaxed a little there. [email or web address]  95% on RA  Labs are normal  Hx CAD, has stents, was see in cardio last Monday and told that if recurrent symptoms likely needs heart cath  Unstable Angina     HPI and ROS:  Mr. Jose Jensen is a pleasant 42year old  [place] gentleman from home. He presented as a transfer from the [facility] ED referred to us by Dr. Mali Bowling for Unstable Angina. He has \"soreness\" like left sided chest pain. The onset was at about 7pm. The re was no radiation at first, but it then radiated to the left neck and the left shoulder. He states that it was constant, with sudden onset and resolution. There was no exacerbation with emotion nor exertion as far as he knows, he states nopthing was particuarly exerting at that time. He states that the chest pain was less intense but otherwise just like when he has had his three heart attacks in the past (2017, 2018, and 2020.) There was associated: dyspnea, diaphoresis, chest pressure, chest tightness, and fatigue. There is no associated: nausea, near syncope, confusion, or weakness. The pain is not pleuritic, not reproducible, and not positional.   He also denies any: myaglia, arthalgia, diarrhea, change in sense of taste, or change in sense of smell.   He also states that he had a heart cath here in 2020 after his last MI.    (following subjective sections reviewed in chart as per charting difficulties from broken computer)    Past Medical History:   Diagnosis Date    CAD (coronary artery disease)     Hand numbness     Hand tingling  Heart attack ([address]     x 3    Hyperlipidemia     Movement disorder      Past Surgical History:   Procedure Laterality Date    CARDIAC CATHETERIZATION  10/25/15  [facility]    stent to RCA. Stent to LAD. angioplasaty to diagonal branch of LAD.     KNEE ARTHROSCOPY      LAMINECTOMY      NECK SURGERY      x 2      Social History     Tobacco History     Smoking Status  Former Smoker Quit date  11/1/2019 Smoking Frequency  0.5 packs/day for 30 years (15 pk yrs) Smoking Tobacco Type  Cigarettes    Smokeless Tobacco Use  Never Used          Alcohol History     Alcohol Use Status  No          Drug Use     Drug Use Status  No          Sexual Activity     Sexually Active  Not Asked              Family History   Problem Relation Age of Onset    Cancer Mother     Cancer Father      Allergies   Allergen Reactions    Lisinopril      cough     Current Facility-Administered Medications   Medication Dose Route Frequency Provider Last Rate Last Admin    sodium chloride flush 0.9 % injection 10 mL  10 mL Intravenous 2 times per day Ángela No MD        sodium chloride flush 0.9 % injection 10 mL  10 mL Intravenous PRN Ángela No MD        acetaminophen (TYLENOL) tablet 650 mg  650 mg Oral Q6H PRN Ángela No MD        Or    acetaminophen (TYLENOL) suppository 650 mg  650 mg Rectal Q6H PRN Ángela No MD        [START ON 3/6/2021] aspirin chewable tablet 81 mg  81 mg Oral Daily Ángela No MD        atorvastatin (LIPITOR) tablet 80 mg  80 mg Oral Nightly Ángela No MD        perflutren lipid microspheres (DEFINITY) injection 1.65 mg  1.5 mL Intravenous ONCE PRN Ángela No MD        ondansetron (ZOFRAN-ODT) disintegrating tablet 4 mg  4 mg Oral Q8H PRN Ángela No MD        Or    ondansetron [facility]) injection 4 mg  4 mg Intravenous Q6H PRN Ángela No MD        polyethylene glycol [facility]) packet 17 g  17 g Oral Daily PRN Ángela No MD        melatonin disintegrating tablet 5 mg  5 mg Oral Nightly PRN Joi De La Cruz MD        nitroGLYCERIN (NITROSTAT) SL tablet 0.4 mg  0.4 mg Sublingual Q5 Min PRN Joi De La Cruz MD        heparin (porcine) injection 4,000 Units  4,000 Units Intravenous Once Joi De La Cruz MD        heparin (porcine) injection 4,000 Units  4,000 Units Intravenous PRN Joi De La Cruz MD        heparin (porcine) injection 2,000 Units  2,000 Units Intravenous PRN Joi De La Cruz MD        heparin 25,000 units in dextrose 5% 250 mL (premix) infusion  9 Units/kg/hr Intravenous Continuous Joi De La Cruz MD         Home Medications:  Prior to Admission medications    Medication Sig Start Date End Date Taking?  Authorizing Provider   carvedilol (COREG) 12.5 MG tablet Take 1 tablet by mouth 2 times daily (with meals) 3/1/21   ENDER Ferrer   isosorbide mononitrate (IMDUR) 60 MG extended release tablet Take 1 tablet by mouth once daily 3/1/21   ENDER Ferrer   losartan (COZAAR) 50 MG tablet Take 1 tablet by mouth daily 11/2/20   ENDER Lombardo   Evolocumab (REPATHA SURECLICK) 781 MG/ML SOAJ Inject 1 mL into the skin every 14 days 7/9/20   ENDER Lombardo   atorvastatin (LIPITOR) 80 MG tablet Take 0.5 tablets by mouth daily 7/8/20   ENDER Lombardo   clopidogrel (PLAVIX) 75 MG tablet Take 1 tablet by mouth once daily 4/13/20   ENDRE Lombardo   famotidine (PEPCID) 10 MG tablet Take 10 mg by mouth daily    Historical Provider, MD   furosemide (LASIX) 20 MG tablet Take 1 tablet by mouth as needed (weight gain) 12/30/19   ENDER Lombardo   nitroGLYCERIN (NITROSTAT) 0.4 MG SL tablet Place 1 tablet under the tongue every 5 minutes as needed for Chest pain 12/30/19   ENDER Lombardo   aspirin 81 MG chewable tablet Take 1 tablet by mouth daily 11/20/19   Deangelo Shen MD   CYCLOBENZAPRINE HCL PO Take 10 mg by mouth nightly    Historical Provider, MD         OBJECTIVE:    Vitals:    03/05/21 0300   BP: 131/83   Pulse: 83   Resp: 17   Temp: 96.2 °F (35.7 °C)   SpO2: 97%   breathing room air    /83   Pulse 83   Temp 96.2 °F (35.7 °C) (Oral)   Resp 17   Ht 5' 9\" (1.753 m)   Wt 244 lb 6.4 oz (110.9 kg)   SpO2 97%   BMI 36.09 kg/m²     No intake or output data in the 24 hours ending 03/05/21 0423    Physical Exam  Vitals signs reviewed. Constitutional:       General: He is not in acute distress. Appearance: Normal appearance. He is obese. He is not ill-appearing or toxic-appearing. HENT:      Head: Normocephalic and atraumatic. Nose: No congestion or rhinorrhea. Eyes:      General:         Right eye: No discharge. Left eye: No discharge. Neck:      Musculoskeletal: Neck supple. Comments: Trachea appears midline  Cardiovascular:      Rate and Rhythm: Normal rate and regular rhythm. Heart sounds: No murmur. No friction rub. No gallop. Pulmonary:      Effort: Pulmonary effort is normal. No respiratory distress. Breath sounds: No stridor. No wheezing, rhonchi or rales. Chest:      Chest wall: No tenderness. Abdominal:      General: Bowel sounds are normal.      Palpations: Abdomen is soft. Tenderness: There is no abdominal tenderness. There is no guarding or rebound. Musculoskeletal:      Comments: No wasting of muscle or fat stores   Skin:     General: Skin is warm. Comments: nondiaphoretic   Neurological:      Mental Status: He is alert. Cranial Nerves: No dysarthria. Motor: No tremor or seizure activity. Psychiatric:         Mood and Affect: Mood normal.         Behavior: Behavior normal.       LABORATORY DATA:    CBC: No results for input(s): WBC, HGB, HCT, PLT in the last 72 hours. BMP: No results for input(s): NA, K, CL, CO2, BUN, CREATININE, CALCIUM, PHOS in the last 72 hours. Invalid input(s): MAGNES    Hepatic Profile: No results for input(s): AST, ALT, BILIDIR, BILITOT, ALKPHOS in the last 72 hours.     Coag Panel: No results for input(s): INR, PROTIME, APTT in the last 72 hours. Cardiac Enzymes: No results for input(s): Parkinson Martinez in the last 72 hours. Pro-BNP: No results for input(s): PROBNP in the last 72 hours. A1C: No results for input(s): LABA1C in the last 72 hours. TSH: Invalid input(s): LABTSH    Lipid Panel: pending      EKG:   Please see sign out above    IMAGING:  No results found. ASESSMENTS & PLANS:    Unstable Angina:  Tele  Admit to cardiac kong as inpatient status patient  Cardio consult in AM  Trend TnI  Mag, Phos, TSH, Lipid Panel, HbA1c, CBC, CMP, PT/INR, PTT, ProBNP - upon arrival  CBC and BMP with Reflex for following AM  ASA as per protocol (324-325mg chewed STAT unless on 81ASA daily in which case 162mg chewed STAT if not yet given, THEN from following AM 81mg Daily.)  Statin as per protocol (High intensity Statin, if already on high intensity Stain of Simvasttain 80 continue it, if Lipitor 40+ then Lipitor 80 (if less than 40 just double so long as >=40), if Rosuvastatin 20mg+ then Rosuvastatin 40mg PO QHS (if less than 20 just double so long as >=20mg)  NG SL PRN CP  TTE in AM  Heparin GGT    Chronic Medical Problems:  Continue current Regimen as indicated     Supoportive and Prophylactic Txx:  DVT Prophylaxis: Heparin GGT  GI (PUD) Ppx: not indicated  PT consult for evalutation and Txx as indicated: not indicated  DIET CLEAR LIQUID; No Caffeine       Care time of >35 minutes  Pt seen/examined and admitted to inpatient status. Inpatient status is used for patients with an expected LOS extending past two midnights due to medical therapy and or critical care needs, otherwise patients are placed to OBServation status. Signed:  Electronically signed by Jacquelyn Man MD on 3/5/21 at 04:27 AM CST.

## 2021-03-05 NOTE — CONSULTS
[facility]  Cardiology Consult      Requesting MD:  Boni Andres MD   Admit Status:  Inpatient [101]       History obtained from:   ? Patient  ? Other (specify):     PROBLEM LIST:    Patient Active Problem List    Diagnosis Date Noted    Chest pain due to CAD ([facility] 03/05/2021     Priority: Low    Cigarette nicotine dependence without complication 84/71/1140     Priority: Low    Chronic systolic congestive heart failure ([facility] 11/16/2019     Priority: Low    Essential hypertension 09/07/2016     Priority: Low    Coronary artery disease involving native coronary artery of native heart with angina pectoris ([facility] 12/23/2015     Priority: Low    History of coronary artery stent placement 12/23/2015     Priority: Low     Overview Note:     ESE to LAD on 10/25/15  Balloon angioplasty to 1st diagonal branch      Mixed hyperlipidemia 12/23/2015     Priority: Low     1. Coronary artery disease, prior MI 2015 with PCI LAD/diagonal bifurcation with stents [facility]), PCI mid RCA 10/25/2015, intermediate restenosis mid LAD/diagonal by catheterization 11/18/2019, ejection fraction 40 to 45%. 2.  Prior tobacco use stopped 11/2019. PRESENTATION: Davon Winters is a 42y.o. year old male who presents with complaints of chest pressure over the last 2 to 3 weeks occurring predominantly at rest lasting about 1 to 2 hours mostly in the evening. Patient is sitting at the time and not doing any activity. He is limited by his neck in terms of carrying weight. Doing laundry or dishes he might feel a mild discomfort in his chest as well. Previously seen in the office and started on nitrates with some improvement. On Repatha for hyperlipidemia. Reports no tobacco use currently. REVIEW OF SYSTEMS:  Review of Systems   Constitutional: Negative for activity change, diaphoresis and fatigue. HENT: Negative for hearing loss, nosebleeds and tinnitus. Eyes: Negative for visual disturbance. Respiratory: Positive for chest tightness. Negative for cough, shortness of breath and wheezing. Cardiovascular: Negative for chest pain, palpitations and leg swelling. Gastrointestinal: Negative for abdominal distention, abdominal pain, blood in stool, diarrhea and vomiting. Endocrine: Negative for cold intolerance, heat intolerance, polydipsia, polyphagia and polyuria. Genitourinary: Negative for difficulty urinating, flank pain and hematuria. Musculoskeletal: Negative for arthralgias, back pain, joint swelling and myalgias. Skin: Negative for pallor and rash. Neurological: Negative for dizziness, seizures, syncope and headaches. Psychiatric/Behavioral: Negative for behavioral problems and dysphoric mood. The patient is not nervous/anxious. Past Medical History:      Diagnosis Date    CAD (coronary artery disease)     Hand numbness     Hand tingling     Heart attack ([facility]     x 3    Hyperlipidemia     Movement disorder        Past Surgical History:      Procedure Laterality Date    CARDIAC CATHETERIZATION  10/25/15  [facility]    stent to RCA. Stent to LAD. angioplasaty to diagonal branch of LAD.     KNEE ARTHROSCOPY      LAMINECTOMY      NECK SURGERY      x 2        Allergies:  Lisinopril    Past Social History:  Social History     Socioeconomic History    Marital status:      Spouse name: Not on file    Number of children: Not on file    Years of education: Not on file    Highest education level: Not on file   Occupational History    Not on file   Social Needs    Financial resource strain: Not on file    Food insecurity     Worry: Not on file     Inability: Not on file    Transportation needs     Medical: Not on file     Non-medical: Not on file   Tobacco Use    Smoking status: Former Smoker     Packs/day: 0.50     Years: 30.00     Pack years: 15.00     Types: Cigarettes     Quit date: 2019     Years since quittin.3    Smokeless tobacco: Never Used   Substance and Sexual Activity    Alcohol use: No    Drug use: No    Sexual activity: Not on file   Lifestyle    Physical activity     Days per week: Not on file     Minutes per session: Not on file    Stress: Not on file   Relationships    Social connections     Talks on phone: Not on file     Gets together: Not on file     Attends Congregational service: Not on file     Active member of club or organization: Not on file     Attends meetings of clubs or organizations: Not on file     Relationship status: Not on file    Intimate partner violence     Fear of current or ex partner: Not on file     Emotionally abused: Not on file     Physically abused: Not on file     Forced sexual activity: Not on file   Other Topics Concern    Not on file   Social History Narrative    Not on file       Family History:       Problem Relation Age of Onset    Cancer Mother     Cancer Father        Home Meds:  Prior to Admission medications    Medication Sig Start Date End Date Taking?  Authorizing Provider   carvedilol (COREG) 12.5 MG tablet Take 1 tablet by mouth 2 times daily (with meals) 3/1/21   ENDER Brody   isosorbide mononitrate (IMDUR) 60 MG extended release tablet Take 1 tablet by mouth once daily 3/1/21   ENDER Brody   losartan (COZAAR) 50 MG tablet Take 1 tablet by mouth daily 11/2/20   ENDER Castanon   Evolocumab (REPATHA SURECLICK) 370 MG/ML SOAJ Inject 1 mL into the skin every 14 days 7/9/20   ENDER Castanon   atorvastatin (LIPITOR) 80 MG tablet Take 0.5 tablets by mouth daily 7/8/20   ENDER Castanon   clopidogrel (PLAVIX) 75 MG tablet Take 1 tablet by mouth once daily 4/13/20   ENDER Castanon   famotidine (PEPCID) 10 MG tablet Take 10 mg by mouth daily    Historical Provider, MD   furosemide (LASIX) 20 MG tablet Take 1 tablet by mouth as needed (weight gain) 12/30/19   ENDER Castanon   nitroGLYCERIN (NITROSTAT) 0.4 MG SL tablet Place 1 tablet under the tongue every 5 minutes as needed for Chest pain 12/30/19   ENDER Lombardo   aspirin 81 MG chewable tablet Take 1 tablet by mouth daily 11/20/19   Deangelo Shen MD   CYCLOBENZAPRINE HCL PO Take 10 mg by mouth nightly    Historical Provider, MD       Current Meds:   sodium chloride flush  10 mL Intravenous 2 times per day    aspirin  81 mg Oral Daily    atorvastatin  80 mg Oral Nightly    losartan  50 mg Oral Daily    [Held by provider] carvedilol  12.5 mg Oral BID WC    clopidogrel  75 mg Oral Daily    isosorbide mononitrate  60 mg Oral Daily    cyclobenzaprine  10 mg Oral Nightly       Current Infused Meds:   heparin (PORCINE) Infusion Stopped (03/05/21 0837)       Physical Exam:  Vitals:    03/05/21 1150   BP: (!) 134/98   Pulse:    Resp:    Temp:    SpO2:      No intake or output data in the 24 hours ending 03/05/21 1254  Estimated body mass index is 36.09 kg/m² as calculated from the following:    Height as of this encounter: 5' 9\" (1.753 m). Weight as of this encounter: 244 lb 6.4 oz (110.9 kg). Physical Exam  Constitutional:       Appearance: He is well-developed. HENT:      Mouth/Throat:      Pharynx: No oropharyngeal exudate. Eyes:      General: No scleral icterus. Right eye: No discharge. Left eye: No discharge. Neck:      Thyroid: No thyromegaly. Vascular: No JVD. Cardiovascular:      Rate and Rhythm: Normal rate and regular rhythm. Heart sounds: No murmur. No friction rub. No gallop. Comments: No JVD  No edema  No systolic or diastolic murmurs noted  Pulmonary:      Effort: No respiratory distress. Breath sounds: No stridor. No wheezing or rales. Abdominal:      General: Bowel sounds are normal. There is no distension. Palpations: Abdomen is soft. There is no mass. Tenderness: There is no abdominal tenderness. There is no guarding or rebound. Comments: No palpable organomegaly   Musculoskeletal:         General: No deformity.    Skin:     General: Skin is warm. Coloration: Skin is not pale. Findings: No erythema or rash. Neurological:      Mental Status: He is alert and oriented to person, place, and time. Motor: No abnormal muscle tone. Coordination: Coordination normal.      Deep Tendon Reflexes: Reflexes normal.           Labs:  Recent Labs     03/05/21  0403   WBC 10.3   HGB 13.6*          Recent Labs     03/05/21  0403      K 3.9      CO2 24   BUN 15   CREATININE 1.0   LABGLOM >60   MG 1.9   CALCIUM 9.0   PHOS 2.8       CK, CKMB, Troponin: @LABRCNT (CKTOTAL:3, CKMB:3, TROPONINI:3)@    Last 3 BNP:          IMAGING:  Echo Complete    Result Date: 3/5/2021  Transthoracic Echocardiography Report (TTE)  Demographics   Patient Name  Luther Portillo Date of Study         03/05/2021   MRN           [id number]          Gender                Male   Date of Birth 1975      Room Number           MHL-0724   Age           42 year(s)   Height:       69 inches       Referring Physician   Jen Rivers MD   Weight:       244.01 pounds   Sonographer           Shelby Michaud [facility]   BSA:          2.25 m^2        Interpreting          Belkys Oro MD                                Physician   BMI:          36.03 kg/m^2  Procedure Type of Study   TTE procedure:ECHO 2D W/DOPPLER/COLOR/CONTRAST. Study Location: Echo Lab Technical Quality: Limited visualization due to body habitus. Patient Status: Outpatient Contrast Medium: Definity. Amount - 8 ml Indications:Chest pain. Conclusions   Summary  Normal left ventricular size with preserved LV function and an estimated  ejection fraction of approximately 55-60%. No evidence of left ventricular mass or thrombus noted. Normal right atrial dimension with no evidence of thrombus or mass noted. Normal right ventricular size with preserved RV function. No evidence of significant pericardial effusion is noted. Structurally normal mitral valve with normal leaflet mobility.  No evidence  of mitral valve stenosis or mild mitral regurgitation. Aortic valve appears to be tricuspid. Structurally normal aortic valve. No significant aortic regurgitation or stenosis is noted. Signature   ----------------------------------------------------------------  Electronically signed by Starla Darling MD(Interpreting  physician) on 03/05/2021 12:12 PM  ----------------------------------------------------------------   Findings   Mitral Valve  Structurally normal mitral valve with normal leaflet mobility. No evidence  of mitral valve stenosis or mild mitral regurgitation. Aortic Valve  Aortic valve appears to be tricuspid. Structurally normal aortic valve. No significant aortic regurgitation or stenosis is noted. Tricuspid Valve  Tricuspid valve is structurally normal.  No evidence of tricuspid regurgitation. Left Atrium  Normal size left atrium. Left Ventricle  Normal left ventricular size with preserved LV function and an estimated  ejection fraction of approximately 55-60%. No evidence of left ventricular mass or thrombus noted. Right Atrium  Normal right atrial dimension with no evidence of thrombus or mass noted. Right Ventricle  Normal right ventricular size with preserved RV function. Pericardial Effusion  No evidence of significant pericardial effusion is noted. Pleural Effusion  No evidence of pleural effusion. Miscellaneous  Aortic root is within normal limits. Allergies   - No known allergies. M-Mode Measurements (cm)   LVIDd: 5.1 cm                        LVIDs: 3.94 cm  IVSd: 0.99 cm  LVPWd: 1.12 cm                       AO Root Dimension: 2.3 cm  % Ejection Fraction: 50 %            LA:  3.2 cm                                       LVOT: 2.1 cm  Doppler Measurements:   AV Peak Gradient: 6.86 mmHg          MV Peak E-Wave: 51.8 cm/s                                       MV Peak A-Wave: 64.7 cm/s                                       MV E/A Ratio: 0.8 % MV Peak Gradient: 1.07 mmHg            Assessment and Plan: This is a 42y.o. year old male with past medical history of known coronary artery disease with prior MI 2015 with prior PCI to LAD/diagonal and mid RCA with catheterization 11/2019 with moderate mid LAD/first diagonal restenosis), moderate LV systolic dysfunction, ejection fraction 40-45% long-standing prior tobacco use stopped 11/2019 presenting with rest onset symptoms of protracted nature, negative troponins. 1.  No significant ST-T changes with negative troponins. He does have intermediate restenosis noted in his LAD and diagonal stents placed in bifurcation pattern from 2015. If this is the etiology of his symptoms he would need consideration for CABG versus repeat PCI. At this time would reassess with a stress nuclear study. If significant symptomatology or abnormality in this territory he will need consideration for cardiac catheterization. 2.  Continue medical management. Can discontinue IV heparin.     Electronically signed by Jared Davidson MD on 3/5/2021 at 12:54 PM

## 2021-03-05 NOTE — DISCHARGE SUMMARY
Cardiology noted he does have intermediate restenosis in LAD and diagonal stents from 2015 however he underwent stress test 3/5 with no evidence of ischemia, no reproducible chest pain and no obvious infarct. Cardiology recommended continued medical management and can consider repeat heart catheterization in the future on outpatient basis. Patient stable for discharge, continue on current cardiovascular regimen, follow-up with PCP and cardiology outpatient. Physical Exam:  Vital Signs: /85   Pulse 76   Temp 98 °F (36.7 °C) (Temporal)   Resp 18   Ht 5' 9\" (1.753 m)   Wt 244 lb 6.4 oz (110.9 kg)   SpO2 95%   BMI 36.09 kg/m²   General appearance:. Alert and Cooperative   HEENT: Normocephalic. Chest: clear to auscultation bilaterally without wheezes or rhonchi. Cardiac: Normal heart tones with regular rate and rhythm, S1, S2 normal  Abdomen:soft, non-tender; normal bowel sounds  Extremities: No clubbing or cyanosis. No peripheral edema. Peripheral pulses palpable. Neurologic: Grossly intact.     Discharge Medications:       Eliagrisel Lanagan   Home Medication Instructions KRN:[id number]    Printed on:03/05/21 2803   Medication Information                      aspirin 81 MG chewable tablet  Take 1 tablet by mouth daily             atorvastatin (LIPITOR) 80 MG tablet  Take 0.5 tablets by mouth daily             carvedilol (COREG) 12.5 MG tablet  Take 1 tablet by mouth 2 times daily (with meals)             clopidogrel (PLAVIX) 75 MG tablet  Take 1 tablet by mouth once daily             CYCLOBENZAPRINE HCL PO  Take 10 mg by mouth nightly             Evolocumab (REPATHA SURECLICK) 296 MG/ML SOAJ  Inject 1 mL into the skin every 14 days             famotidine (PEPCID) 10 MG tablet  Take 10 mg by mouth daily             furosemide (LASIX) 20 MG tablet  Take 1 tablet by mouth as needed (weight gain)             isosorbide mononitrate (IMDUR) 60 MG extended release tablet  Take 1 tablet by mouth once daily             losartan (COZAAR) 50 MG tablet  Take 1 tablet by mouth daily             nitroGLYCERIN (NITROSTAT) 0.4 MG SL tablet  Place 1 tablet under the tongue every 5 minutes as needed for Chest pain                 Discharge Instructions: Follow up with SAMARIA Maravilla. Contact for follow-up appointment. Follow-up with cardiology as scheduled. Take medications as directed. Resume activity as tolerated. Disposition: Patient is medically stable and will be discharged home. Time spent on discharge over 30 minutes.     Signed:  Tre Vargas MD  3/5/2021 9:35 AM

## 2021-03-05 NOTE — PROGRESS NOTES
82-year-old male with CAD with prior stenting followed by cardiology admitted overnight 3/5 as transfer from [facility] ED with substernal chest pain. Recently seen outpatient by cardiology and informed that if he developed recurrent chest pain may need cardiac catheterization. Reported history of cardiac catheterization in 2020 following last MI.     Cardiac monitoring  Trend troponin  Guideline directed cardiovascular medical therapy  Cardiology consult  As needed nitro for chest pain    Keep n.p.o. until cardiology evaluation for possible intervention      Boni Andres MD

## 2021-03-08 ENCOUNTER — TELEPHONE (OUTPATIENT)
Dept: INTERNAL MEDICINE | Age: 46
End: 2021-03-08

## 2021-03-08 VITALS
HEIGHT: 69 IN | OXYGEN SATURATION: 97 % | TEMPERATURE: 97.7 F | RESPIRATION RATE: 18 BRPM | SYSTOLIC BLOOD PRESSURE: 136 MMHG | BODY MASS INDEX: 36.2 KG/M2 | WEIGHT: 244.4 LBS | DIASTOLIC BLOOD PRESSURE: 86 MMHG | HEART RATE: 92 BPM

## 2021-03-08 NOTE — TELEPHONE ENCOUNTER
[facility] Transitions Initial Follow Up Call    Outreach made within 2 business days of discharge: Yes    Patient: Duran Joyner   Patient : 1975   MRN: [id number]   Reason for Admission: Admitted 21 for chest pain  Discharge Date: 3/5/21    Discharge Diagnoses:  Principal Problem:    Chest pain due to CAD [facility])    Coronary artery disease involving native coronary artery of native heart with angina pectoris ([address]    History of coronary artery stent placement    Mixed hyperlipidemia    Essential hypertension    Chronic systolic congestive heart failure [facility])    Discharge department/facility: [facility]     Follow up is within 2 business days of discharge, no outreach needed. If patient does not keep appointment today, I will reach out this afternoon.        Follow Up  Future Appointments   Date Time Provider Nemesio Keith   3/8/2021  1:00 PM Wing Susan MD [facility]   3/10/2021 10:30 AM ENDER Alvarez [facility]   2021 10:30 AM SAMARIA Maravilla [facility]   10/6/2021  3:45 PM Algernon Callow, MD [facility] [address] [place] [address]

## 2021-03-08 NOTE — TELEPHONE ENCOUNTER
[facility] Trans[facility] Initial Follow Up Call    Outreach made within 2 business days of discharge: Yes    Patient: Saige Shukla   Patient : 1975     MRN: [id number]   Reason for Admission: Admitted 21 for chest pain due to CAD   Discharge Date: 3/5/21    Discharge Diagnoses:  Principal Problem:    Chest pain due to CAD [facility])    Coronary artery disease involving native coronary artery of native heart with angina pectoris ([facility]    History of coronary artery stent placement    Mixed hyperlipidemia    Essential hypertension    Chronic systolic congestive heart failure ([facility]   Spoke with: 2nd attempt to reach patient, no answer. Message left with intent of my call. I was unable to get in touch with him to reschedule his cancelled hospital follow up visit.      Discharge department/facility: [facility]     Follow Up  Future Appointments   Date Time Provider Nemesio Keith   3/10/2021 10:30 AM ENDER Serrano [facility]   2021 10:30 AM SAMARIA Clay [facility]   10/6/2021  3:45 PM MD LUBA Falcon [facility]       [place], [place]

## 2021-03-09 LAB
LV EF: 46 %
LVEF MODALITY: NORMAL

## 2021-03-10 ENCOUNTER — VIRTUAL VISIT (OUTPATIENT)
Dept: CARDIOLOGY CLINIC | Age: 46
End: 2021-03-10
Payer: MEDICARE

## 2021-03-10 DIAGNOSIS — Z95.5 HISTORY OF CORONARY ARTERY STENT PLACEMENT: ICD-10-CM

## 2021-03-10 DIAGNOSIS — I25.10 CORONARY ARTERY DISEASE INVOLVING NATIVE CORONARY ARTERY OF NATIVE HEART, ANGINA PRESENCE UNSPECIFIED: Primary | ICD-10-CM

## 2021-03-10 DIAGNOSIS — I10 ESSENTIAL HYPERTENSION: ICD-10-CM

## 2021-03-10 DIAGNOSIS — E78.2 MIXED HYPERLIPIDEMIA: ICD-10-CM

## 2021-03-10 DIAGNOSIS — I50.22 CHRONIC SYSTOLIC CONGESTIVE HEART FAILURE (HCC): ICD-10-CM

## 2021-03-10 DIAGNOSIS — R07.89 FEELING OF CHEST TIGHTNESS: ICD-10-CM

## 2021-03-10 PROCEDURE — 99442 PR PHYS/QHP TELEPHONE EVALUATION 11-20 MIN: CPT | Performed by: NURSE PRACTITIONER

## 2021-03-10 NOTE — PROGRESS NOTES
Davon Winters is a 42 y.o. male evaluated via telephone on 3/10/2021. Consent:  He and/or health care decision maker is aware that that he may receive a bill for this telephone service, depending on his insurance coverage, and has provided verbal consent to proceed: Yes    Documentation:  I communicated with the patient and/or health care decision maker about CAD. Details of this discussion including any medical advice provided: Heart health. I affirm this is a Patient Initiated Episode with an Established Patient who has not had a related appointment within my department in the past 7 days or scheduled within the next 24 hours. Total Time: minutes: 11-20 minutes    Note: not billable if this call serves to triage the patient into an appointment for the relevant concern    Lan Calloway      3/10/2021    Audio Patient Encouter(During RHOHT-73 public health emergency)  The telephone encourter was conducted with patient in their residence from [address] with ENDER Cristobal; assistance by Hina Chakraborty MA.    HPI:  Arabella Jose Antonio Osborne   Diagnosis Orders   1. Coronary artery disease involving native coronary artery of native heart, angina presence unspecified     2. Chronic systolic congestive heart failure ([facility] [address]     3. History of coronary artery stent placement     4. Mixed hyperlipidemia     5. Essential hypertension     6. Feeling of chest tightness       The patient presents today for telephone visit. He was last seen in the office on 3/1/21 with vague complaints of chest tightness. Imdur was increased with plans to follow up in one week. He reports \"I went to the ER on 3/5/21 as the tightness radiated to my neck. I was just sitting in the chair when it happened. I think overall I am better, still have some though. \"  No report of exertional symptoms. No associated SOA, diaphoresis or nausea. Dr. Genna Collins was consulted on 3/5/21. Troponin negative. No significant EKG changes. Miguelronnie Kahn was negative for ischemia. Dr. Frank Jacome assessment and plan: \"This is a 42 y.o. year old male with past medical history of known coronary artery disease with prior MI 2015 with prior PCI to LAD/diagonal and mid RCA with catheterization 11/2019 with moderate mid LAD/first diagonal restenosis), moderate LV systolic dysfunction, ejection fraction 40-45% long-standing prior tobacco use stopped 11/2019 presenting with rest onset symptoms of protracted nature, negative troponins.   1.  No significant ST-T changes with negative troponins. He does have intermediate restenosis noted in his LAD and diagonal stents placed in bifurcation pattern from 2015. If this is the etiology of his symptoms he would need consideration for CABG versus repeat PCI. At this time would reassess with a stress nuclear study. If significant symptomatology or abnormality in this territory he will need consideration for cardiac catheterization. 2.  Continue medical management. Can discontinue IV heparin. \"   Electronically signed by Benedicto Perkins MD on 3/5/2021 at 12:54 PM      SUBJECTIVE:  Mya Falcon denies exertional chest pain, shortness of breath, orthopnea, paroxysmal nocturnal dyspnea, syncope, presyncope, sensed arrhythmia, edema and fatigue. The patient denies numbness or weakness to suggest cerebrovascular accident or transient ischemic attack. + occasional non exertional chest tightness. No associated symptoms. Review of Systems    Prior to Visit Medications    Medication Sig Taking?  Authorizing Provider   carvedilol (COREG) 12.5 MG tablet Take 1 tablet by mouth 2 times daily (with meals) Yes ENDER Rossi   isosorbide mononitrate (IMDUR) 60 MG extended release tablet Take 1 tablet by mouth once daily Yes ENDER Rossi   losartan (COZAAR) 50 MG tablet Take 1 tablet by mouth daily Yes ENDER Polanco   Evolocumab (REPATHA SURECLICK) 428 MG/ML SOAJ Inject 1 mL into the skin every 14 days Yes Osmany Crockett APRN   atorvastatin (LIPITOR) 80 MG tablet Take 0.5 tablets by mouth daily Yes Jarod Morning, APRN   clopidogrel (PLAVIX) 75 MG tablet Take 1 tablet by mouth once daily Yes  Morning, APRN   famotidine (PEPCID) 10 MG tablet Take 10 mg by mouth daily Yes Historical Provider, MD   furosemide (LASIX) 20 MG tablet Take 1 tablet by mouth as needed (weight gain) Yes  Morning, APRN   nitroGLYCERIN (NITROSTAT) 0.4 MG SL tablet Place 1 tablet under the tongue every 5 minutes as needed for Chest pain Yes  Morning, APRN   aspirin 81 MG chewable tablet Take 1 tablet by mouth daily Yes Ronnell Barajas MD   CYCLOBENZAPRINE HCL PO Take 10 mg by mouth nightly Yes Historical Provider, MD       Social History     Tobacco Use    Smoking status: Former Smoker     Packs/day: 0.50     Years: 30.00     Pack years: 15.00     Types: Cigarettes     Quit date: 2019     Years since quittin.3    Smokeless tobacco: Never Used   Substance Use Topics    Alcohol use: No    Drug use: No        REVIEW OF SYSTEMS:  Constitutional  no appetite change, or unexpected weight change. No fever, chills or diaphoresis. No significant change in activity level or new onset of fatigue. HEENT  no significant rhinorrhea or epistaxis. No tinnitus or significant hearing loss. Eyes  no sudden vision change or amaurosis. No corneal arcus, xantholasma, subconjunctival hemorrhage or discharge. Respiratory  no significant wheezing, stridor, apnea or cough. No dyspnea on exertion or shortness of air. Cardiovascular  no exertional chest pain to suggest myocardial ischemia. No orthopnea or PND. No sensation of sustained arrythmia. No occurrence of slow heart rate. No palpitations. No claudication. + occasional non exertional chest tightness. No associated symptoms. Gastrointestinal  no abdominal swelling or pain. No blood in stool. No severe constipation, diarrhea, nausea, or vomiting.    Genitourinary  no dysuria, frequency, or urgency. No flank pain or hematuria. Musculoskeletal  no back pain or myalgia. No problems with gait. Extremities - no clubbing, cyanosis or extremity edema. Skin  no color change or rash. No pallor. No new surgical incision. Neurologic  no speech difficulty, facial asymmetry or lateralizing weakness. No seizures, presyncope or syncope. No significant dizziness. Hematologic  no easy bruising or excessive bleeding. Psychiatric  no severe anxiety or insomnia. No confusion. All other review of systems are negative. DATA REVIEWED:  3/5/21 Lexiscan  Impression:   Fair effort tolerance with no EKG evidence of ischemia and no   reproducible chest pain. There is no obvious infarct or ischemia, with a calculated ejection   fraction of 46 % . Suggest: Clinical correlation with consideration for medical   management. Signed by Dr Nicky Walsh on 3/5/2021 2:56 PM     2/18/20 echo   Normal left ventricular size with decreased LV function and an estimated  ejection fraction of approximately 40%. Aortic root is within normal limits. The IVC is normal .   Limited study to evaluate heart function. Signature    ----------------------------------------------------------------   Electronically signed by Umair Villa MD(Interpreting   physician) on 02/18/2020 04:57 PM    11/16/19 cath Dr. Robert Ortega    Intermediate grade disease. Intermediate restenosis and proximal to mid LAD stent. Intermediate grade restenosis in the ostial to proximal 1st diagonal  stent. Patent mid RCA stent. Normal LV ejection fraction. Recommendations    Medical management. Would maintain patient on Plavix going forward. Smoking cessation stressed.     Signatures    ----------------------------------------------------------------   Electronically signed by Denys Halsted Mani,MD(Performing Physician) on  11/19/2019 08:59    Lab Results   Component Value Date    WBC 10.3 03/05/2021    HGB 13.6 (L) 03/05/2021    HCT 41.2 (L) 03/05/2021    MCV 88.0 03/05/2021     03/05/2021     Lab Results   Component Value Date     03/05/2021    K 3.9 03/05/2021     03/05/2021    CO2 24 03/05/2021    BUN 15 03/05/2021    CREATININE 1.0 03/05/2021    GLUCOSE 94 03/05/2021    CALCIUM 9.0 03/05/2021    PROT 6.7 03/05/2021    LABALBU 4.0 03/05/2021    BILITOT <0.2 03/05/2021    ALKPHOS 116 03/05/2021    AST 23 03/05/2021    ALT 59 (H) 03/05/2021    LABGLOM >60 03/05/2021    GFRAA >59 03/05/2021       Lab Results   Component Value Date    CHOL 95 (L) 03/05/2021    CHOL 101 (L) 10/15/2020    CHOL 143 (L) 06/29/2020     Lab Results   Component Value Date    TRIG 141 03/05/2021    TRIG 106 10/15/2020    TRIG 84 06/29/2020     Lab Results   Component Value Date    HDL 40 (L) 03/05/2021    HDL 43 (L) 10/15/2020    HDL 37 (L) 06/29/2020     Lab Results   Component Value Date    LDLCALC 27 03/05/2021    [address] 37 10/15/2020    [address] 89 06/29/2020     Lab Results   Component Value Date    TROPONINI <0.01 03/05/2021     Assessment:   Diagnosis Orders   1. Coronary artery disease involving native coronary artery of native heart, angina presence unspecified     2. Chronic systolic congestive heart failure ([address]     3. History of coronary artery stent placement     4. Mixed hyperlipidemia     5. Essential hypertension     6. Feeling of chest tightness       CAD - reports chest tightness improved with increase of Imdur. No exertional symptoms. 3/5/21 ED visit - negative troponin and Lexiscan. Medical management includes Losartan, Imdur, Coreg, Plavix and Lipitor. Discussed proceeding with cath. Patient declined for now as reports doing better. Advised to notify office with worsened symptoms.     HTN - normotensive on current regimen.     Hyperlipidemia - LDL 37 on Lipitor 80 mg daily.     Chronic systolic heart failure - NYHA class 1 stage B  Continues on Coreg, Losartan and Lasix. Currently euvolemic.     PLAN:  Continue current medical management. Continue current medications as prescribed. Continue to follow up with primary care provider for non cardiac medical problems. Call the office with any problems, questions or concerns at [phone]. Follow up with cardiologist as scheduled. Dr. Parris Lyn as instructed. The following educational material has been included in this after visit summary for patient review:  Heart health. NTG slAdrian Bazan is a 42 y.o. male being evaluated by a telephone encounter to address concerns as mentioned above. A caregiver was present when appropriate. Due to this being a TeleHealth encounter (During [facility] public health emergency). Pursuant to the emergency declaration under the [address] [place] [address], [address] [place] [address] authority and the [employer] and [employer] Act, this Virtual Visit was conducted with patient's (and/or legal guardian's) consent, to reduce the patient's risk of exposure to COVID-19 and provide necessary medical care. The patient (and/or legal guardian) has also been advised to contact this office for worsening conditions or problems, and seek emergency medical treatment and/or call 911 if deemed necessary. Services were provided through a telephone discussion virtually to substitute for in-person clinic visit. Patient and provider were located at their individual homes. --ENDER Asencio on 3/10/2021 at 11:00 AM    An electronic signature was used to authenticate this note.

## 2021-03-10 NOTE — PATIENT INSTRUCTIONS
you control your cholesterol you are giving your arteries their best chance to remain clear. It is recommended that you get cholesterol lab work done once a year. 3) Reduce blood sugar - most of the food we eat is turning into glucose or blood sugar that our body uses for energy. Over time, high levels of blood sugar can damage your heart, kidneys, eyes and nerves. 4) Get active - living an active life is one of the most rewarding gifts you can give yourself and those you love. Simply put, daily physical activity increases your length and quality of life. Strive to exercise 15 minutes most days of the week. 5)  Eat better - A healthy diet is one of your best weapons for fighting cardiovascular disease. When you eat a heart healthy diet, you improve your chances for feeling good and staying healthy for life. 6)  Lose weight - when you shed extra fat an unnecessary pounds, you reduce the burden on your hear, lungs, blood vessels and skeleton. You give yourself the gift of active living, you lower your blood pressure and help yourself feel better. 7) Stop smoking - cigarette smokers have a higher risk of developing cardiovascular disease. If  You smoke, quitting is the best thing you can do for your health. Check American Heart Association on line for more information on Life's Simple 7 and tips for healthy living. A Healthy Heart: Care Instructions  Your Care Instructions     Coronary artery disease, also called heart disease, occurs when a substance called plaque builds up in the vessels that supply oxygen-rich blood to your heart muscle. This can narrow the blood vessels and reduce blood flow. A heart attack happens when blood flow is completely blocked. A high-fat diet, smoking, and other factors increase the risk of heart disease. Your doctor has found that you have a chance of having heart disease. You can do lots of things to keep your heart healthy.  It may not be easy, but you can change your diet, exercise more, and quit smoking. These steps really work to lower your chance of heart disease. Follow-up care is a key part of your treatment and safety. Be sure to make and go to all appointments, and call your doctor if you are having problems. It's also a good idea to know your test results and keep a list of the medicines you take. How can you care for yourself at home? Diet  · Use less salt when you cook and eat. This helps lower your blood pressure. Taste food before salting. Add only a little salt when you think you need it. With time, your taste buds will adjust to less salt. · Eat fewer snack items, fast foods, canned soups, and other high-salt, high-fat, processed foods. · Read food labels and try to avoid saturated and trans fats. They increase your risk of heart disease by raising cholesterol levels. · Limit the amount of solid fat-butter, margarine, and shortening-you eat. Use olive, peanut, or canola oil when you cook. Bake, broil, and steam foods instead of frying them. · Eat a variety of fruit and vegetables every day. Dark green, deep orange, red, or yellow fruits and vegetables are especially good for you. Examples include spinach, carrots, peaches, and berries. · Foods high in fiber can reduce your cholesterol and provide important vitamins and minerals. High-fiber foods include whole-grain cereals and breads, oatmeal, beans, brown rice, citrus fruits, and apples. · Eat lean proteins. Heart-healthy proteins include seafood, lean meats and poultry, eggs, beans, peas, nuts, seeds, and soy products. · Limit drinks and foods with added sugar. These include candy, desserts, and soda pop. Lifestyle changes  · If your doctor recommends it, get more exercise. Walking is a good choice. Bit by bit, increase the amount you walk every day. Try for at least 30 minutes on most days of the week. You also may want to swim, bike, or do other activities. · Do not smoke.  If you need help quitting, talk to your doctor about stop-smoking programs and medicines. These can increase your chances of quitting for good. Quitting smoking may be the most important step you can take to protect your heart. It is never too late to quit. · Limit alcohol to 2 drinks a day for men and 1 drink a day for women. Too much alcohol can cause health problems. · Manage other health problems such as diabetes, high blood pressure, and high cholesterol. If you think you may have a problem with alcohol or drug use, talk to your doctor. Medicines  · Take your medicines exactly as prescribed. Call your doctor if you think you are having a problem with your medicine. · If your doctor recommends aspirin, take the amount directed each day. Make sure you take aspirin and not another kind of pain reliever, such as acetaminophen (Tylenol). When should you call for help? [id number] if you have symptoms of a heart attack. These may include:  · Chest pain or pressure, or a strange feeling in the chest.  · Sweating. · Shortness of breath. · Pain, pressure, or a strange feeling in the back, neck, jaw, or upper belly or in one or both shoulders or arms. · Lightheadedness or sudden weakness. · A fast or irregular heartbeat. After you call 911, the  may tell you to chew 1 adult-strength or 2 to 4 low-dose aspirin. Wait for an ambulance. Do not try to drive yourself. Watch closely for changes in your health, and be sure to contact your doctor if you have any problems. Where can you learn more? Go to https://Rent.com.ZeroFOX. org and sign in to your [employer] account. Enter R982 in the [facility] box to learn more about \"A Healthy Heart: Care Instructions. \"     If you do not have an account, please click on the \"Sign Up Now\" link. Current as of: December 16, 2019               Content Version: 12.5  © 8591-9451 Healthwise, [employer]orporated. Care instructions adapted under license by [facility] ([facility]).  If you have questions about a medical condition or this instruction, always ask your healthcare professional. [place] [address] any warranty or liability for your use of this information. How to take:  NITROGLYCERIN (Nitrostat) 0.4 mg tablets, sublingual.  Nitroglycerin is in a group of drugs called nitrates. Nitroglycerin dilates (widens) blood vessels, making it easier for blood to flow through them and easier for the heart to pump. Dosing Guidelines for Nitroglycerin Tablets  · At the start of an angina (chest pain) attack, place one tablet under the tongue or between the cheek and gum. Do not swallow or chew the tablet; let it dissolve on its own. If necessary, a second and third tablet may be used, with five minutes between using each tablet. If you use a third tablet and your chest pain continues, it is time to seek immediate medical attention. Call 911 immediately and have someone drive you to the emergency room. You may be having a heart attack or other serious heart problem.     · To prevent angina from exercise or stress, use 1 tablet 5 to 10 minutes before the activity.      '

## 2021-04-13 DIAGNOSIS — I25.10 CORONARY ARTERY DISEASE INVOLVING NATIVE CORONARY ARTERY OF NATIVE HEART WITHOUT ANGINA PECTORIS: ICD-10-CM

## 2021-04-13 DIAGNOSIS — E78.2 MIXED HYPERLIPIDEMIA: ICD-10-CM

## 2021-04-13 RX ORDER — FUROSEMIDE 20 MG/1
TABLET ORAL
Qty: 30 TABLET | Refills: 1 | Status: SHIPPED | OUTPATIENT
Start: 2021-04-13

## 2021-04-13 RX ORDER — CLOPIDOGREL BISULFATE 75 MG/1
TABLET ORAL
Qty: 90 TABLET | Refills: 1 | Status: SHIPPED | OUTPATIENT
Start: 2021-04-13 | End: 2021-10-06

## 2021-04-22 ENCOUNTER — OFFICE VISIT (OUTPATIENT)
Dept: CARDIOLOGY CLINIC | Age: 46
End: 2021-04-22
Payer: MEDICARE

## 2021-04-22 VITALS
OXYGEN SATURATION: 100 % | BODY MASS INDEX: 37.33 KG/M2 | SYSTOLIC BLOOD PRESSURE: 132 MMHG | DIASTOLIC BLOOD PRESSURE: 84 MMHG | HEART RATE: 94 BPM | WEIGHT: 252 LBS | HEIGHT: 69 IN

## 2021-04-22 DIAGNOSIS — I25.119 CORONARY ARTERY DISEASE INVOLVING NATIVE CORONARY ARTERY OF NATIVE HEART WITH ANGINA PECTORIS (HCC): ICD-10-CM

## 2021-04-22 DIAGNOSIS — E78.2 MIXED HYPERLIPIDEMIA: ICD-10-CM

## 2021-04-22 DIAGNOSIS — Z95.5 HISTORY OF CORONARY ARTERY STENT PLACEMENT: ICD-10-CM

## 2021-04-22 DIAGNOSIS — I25.119 CORONARY ARTERY DISEASE INVOLVING NATIVE CORONARY ARTERY OF NATIVE HEART WITH ANGINA PECTORIS (HCC): Primary | ICD-10-CM

## 2021-04-22 DIAGNOSIS — I10 ESSENTIAL HYPERTENSION: ICD-10-CM

## 2021-04-22 LAB
ALBUMIN SERPL-MCNC: 3.9 G/DL (ref 3.5–5.2)
ALP BLD-CCNC: 142 U/L (ref 40–130)
ALT SERPL-CCNC: 71 U/L (ref 5–41)
ANION GAP SERPL CALCULATED.3IONS-SCNC: 9 MMOL/L (ref 7–19)
AST SERPL-CCNC: 32 U/L (ref 5–40)
BILIRUB SERPL-MCNC: 0.3 MG/DL (ref 0.2–1.2)
BUN BLDV-MCNC: 12 MG/DL (ref 6–20)
CALCIUM SERPL-MCNC: 8.8 MG/DL (ref 8.6–10)
CHLORIDE BLD-SCNC: 99 MMOL/L (ref 98–111)
CO2: 28 MMOL/L (ref 22–29)
CREAT SERPL-MCNC: 1.1 MG/DL (ref 0.5–1.2)
GFR AFRICAN AMERICAN: >59
GFR NON-AFRICAN AMERICAN: >60
GLUCOSE BLD-MCNC: 93 MG/DL (ref 74–109)
HCT VFR BLD CALC: 42.6 % (ref 42–52)
HEMOGLOBIN: 13.9 G/DL (ref 14–18)
MCH RBC QN AUTO: 29.2 PG (ref 27–31)
MCHC RBC AUTO-ENTMCNC: 32.6 G/DL (ref 33–37)
MCV RBC AUTO: 89.5 FL (ref 80–94)
PDW BLD-RTO: 13.8 % (ref 11.5–14.5)
PLATELET # BLD: 228 K/UL (ref 130–400)
PMV BLD AUTO: 10.9 FL (ref 9.4–12.4)
POTASSIUM SERPL-SCNC: 3.8 MMOL/L (ref 3.5–5)
RBC # BLD: 4.76 M/UL (ref 4.7–6.1)
SODIUM BLD-SCNC: 136 MMOL/L (ref 136–145)
TOTAL PROTEIN: 6.8 G/DL (ref 6.6–8.7)
WBC # BLD: 7.1 K/UL (ref 4.8–10.8)

## 2021-04-22 PROCEDURE — 93000 ELECTROCARDIOGRAM COMPLETE: CPT | Performed by: NURSE PRACTITIONER

## 2021-04-22 PROCEDURE — 1036F TOBACCO NON-USER: CPT | Performed by: NURSE PRACTITIONER

## 2021-04-22 PROCEDURE — G8417 CALC BMI ABV UP PARAM F/U: HCPCS | Performed by: NURSE PRACTITIONER

## 2021-04-22 PROCEDURE — 99214 OFFICE O/P EST MOD 30 MIN: CPT | Performed by: NURSE PRACTITIONER

## 2021-04-22 PROCEDURE — G8427 DOCREV CUR MEDS BY ELIG CLIN: HCPCS | Performed by: NURSE PRACTITIONER

## 2021-04-22 NOTE — PATIENT INSTRUCTIONS
catheterization. If for any reason you are unable to keep this appointment, please contact [facility], [phone], as soon as possible to reschedule.  -------------------------------------------------------------------------------------------------------------------  Cardiac Catheterization   (Coronary Angiography; Coronary Arteriography; Coronary Angiogram)   Definition:  Cardiac catheterization is a test that uses a catheter (tube) and x-ray machine to assess the heart and its blood supply. Reasons for Procedure   It is used to find the cause of symptoms, like chest pain, that could mean heart problems. Cardiac catheterization helps doctors to:    Identify narrowed or clogged arteries of the heart    Measure blood pressure within the heart    Evaluate how well the heart valves and chambers function    Check heart defects    Evaluate an enlarged heart    Decide on an appropriate treatment   Possible Complications   If you are planning to have a cardiac catheterization, your doctor will review a list of possible complications, which may include:    Bleeding at the point of the catheter insertion    Damage to arteries    Heart attack or arrhythmia (abnormal heart beats)    Allergic reaction to x-ray dye    Blood clot formation    Infection   Some factors that may increase the risk of complications include:    Allergies to medicines or x-ray dye    Obesity    Smoking    Bleeding disorder    Age: 61 or older    Recent pneumonia    Recent heart attack    Diabetes    Kidney disease   What to Expect Prior to Procedure   Your doctor may order:    Blood and urine tests    Electrocardiogram (ECG, EKG)a test that records the heart's activity by measuring electrical currents through the heart muscle    Chest x-ray    Stress test   Talk to your doctor about your medicines.  You may be asked to stop taking some medicines before the procedure, like:    Anti-inflammatory drugs (eg, ibuprofen )    Blood thinners, like or warfarin (Coumadin), Xarelto, Eliquis, Pradaxa or Sayvasa    clopidogrel (Plavix)    Metformin (Glucophage) or glyburide and metformin (Glucovance)   Leading up to your procedure:    Arrange for a ride to and from the procedure.  The night before, do not eat or drink anything after midnight. Anesthesia   Local anesthesia will be used at the insertion site. A mild sedative may be given one hour before or through IV during the procedure. This will help you relax. Description of the Procedure   During the procedure, you will receive IV fluids and medicines. An EKG will be monitoring your heart's activity. You will be awake but sedated so that you will be more relaxed. Your doctor will ask you to do basic functions such as coughing, breathing out, and holding your breath. If you feel any chest pain, dizziness, nausea, tingling, or other discomfort, tell your doctor. The area of the groin or arm where the catheter will be inserted is shaved, cleaned, and numbed. A needle will be inserted into a blood vessel. A wire will be passed through the needle and into the blood vessel. The wire will then be guided through until it reaches your heart. A soft, flexible catheter tube will then be slipped over the wire and threaded up to your heart. The doctor will be taking x-ray pictures during the procedure to know where the wire and catheter are. Dye will be injected into the arteries of the heart. This will make the arteries and heart show up on the x-ray images. You may feel warm during the dye injection. Insertion of Catheter with Guide Wire    Once in place, the catheter can be used to take measurements. Blood pressure can be taken within the heart's different chambers. Blood samples may also be taken. Multiple x-ray images will be taken to look for any disease in the arteries. An aortogram may also be done at this time.  This step will give a clear image of the aorta (large artery leaving the heart). Once all the tests and images are complete, the catheter will be removed. Sometimes, the doctor will perform balloon angioplasty and stenting if he finds an area in your arteries that is narrow or clogged. These are procedures that help to open narrowed arteries. Finally, a bandage will be placed over the groin or arm area. How Long Will It Take? The procedure takes about 1-2 hours. Preparation before the test will take another 1-2 hours. How Much Will It Hurt? Although the procedure is generally not painful, it can cause some discomfort, including:    Burning sensation (when skin at catheter insertion site is anesthetized)    Pressure when catheter is inserted or replaced with other catheters    A flushing feeling or nausea when the dye is injected    Headache    Heart palpitations   Pain medicine will be given when needed. Average Hospital Stay:  0-1 days     Postoperative Care At the [address] [place] [address] EKG and blood studies may be done.  You will likely need to lie still and flat on your back for a period of time. A pressure dressing may be placed over the area where the catheter was inserted to help prevent bleeding. It is important to follow the nurse's directions. At Home   When you return home, do the following to help ensure a smooth recovery:    Do not drive until your doctor says it is okay.  Do not lift heavy objects or engage in strenuous exercise or sexual activity for at least 5-7 days.  Change the dressing around the incision area as instructed.  Your doctor will explain to you which medicines you can take and which ones to avoid. Take medicines as instructed.  Ice may help decrease discomfort at the insertion site. You may apply the ice for 15-20 minutes each hour, for the first few days.  To lower your risk for further complications of heart disease, you can make lifestyle changes.  These include eating a healthier diet, exercising regularly, and managing stress.  Ask your doctor about when it is safe to shower, bathe, or soak in water.  Be sure to follow your doctor's instructions . After arriving home, contact your doctor if any of the following occurs:    Signs of infection, including fever and chills    Extreme sweating, nausea, or vomiting    Change in sensation to affected leg, including numbness, feeling cold, or change in color    Redness, swelling, increasing pain, excessive bleeding, or discharge at point of catheter insertion    Cough, shortness of breath, or difficulty breathing    Extreme pain    Chest pain    Drooping facial muscles    Changes in vision or speech    Difficulty walking or using your limbs    In case of an emergency, Call 911. Patient Instructions:  Continue current medications as prescribed. Always keep a current medication list. Bring your medications to every office visit. Continue to follow up with primary care provider for non cardiac medical problems. Call the office with any problems, questions or concerns at [phone]. If you have been asked to keep a blood pressure log, do so for 2 weeks. Call the office to report readings to the triage nurse at [phone]. Follow up with cardiologist as scheduled. The following educational material has been included in this after visit summary for your review: Life simple 7. Heart health. Life simple 7  1) Manage blood pressure - high blood pressure is a major risk factor for heart disease and stroke. Keeping blood pressure in health range reduces strain on your heart, arteries and kidneys. Blood pressure goal is less than 130/80. 2) Control cholesterol - contributes to plaque, which can clog arteries and lead to heart disease and stroke. When you control your cholesterol you are giving your arteries their best chance to remain clear. It is recommended that you get cholesterol lab work done once a year.   3) to make and go to all appointments, and call your doctor if you are having problems. It's also a good idea to know your test results and keep a list of the medicines you take. How can you care for yourself at home? Diet  · Use less salt when you cook and eat. This helps lower your blood pressure. Taste food before salting. Add only a little salt when you think you need it. With time, your taste buds will adjust to less salt. · Eat fewer snack items, fast foods, canned soups, and other high-salt, high-fat, processed foods. · Read food labels and try to avoid saturated and trans fats. They increase your risk of heart disease by raising cholesterol levels. · Limit the amount of solid fat-butter, margarine, and shortening-you eat. Use olive, peanut, or canola oil when you cook. Bake, broil, and steam foods instead of frying them. · Eat a variety of fruit and vegetables every day. Dark green, deep orange, red, or yellow fruits and vegetables are especially good for you. Examples include spinach, carrots, peaches, and berries. · Foods high in fiber can reduce your cholesterol and provide important vitamins and minerals. High-fiber foods include whole-grain cereals and breads, oatmeal, beans, brown rice, citrus fruits, and apples. · Eat lean proteins. Heart-healthy proteins include seafood, lean meats and poultry, eggs, beans, peas, nuts, seeds, and soy products. · Limit drinks and foods with added sugar. These include candy, desserts, and soda pop. Lifestyle changes  · If your doctor recommends it, get more exercise. Walking is a good choice. Bit by bit, increase the amount you walk every day. Try for at least 30 minutes on most days of the week. You also may want to swim, bike, or do other activities. · Do not smoke. If you need help quitting, talk to your doctor about stop-smoking programs and medicines. These can increase your chances of quitting for good.  Quitting smoking may be the most important step you can take to protect your heart. It is never too late to quit. · Limit alcohol to 2 drinks a day for men and 1 drink a day for women. Too much alcohol can cause health problems. · Manage other health problems such as diabetes, high blood pressure, and high cholesterol. If you think you may have a problem with alcohol or drug use, talk to your doctor. Medicines  · Take your medicines exactly as prescribed. Call your doctor if you think you are having a problem with your medicine. · If your doctor recommends aspirin, take the amount directed each day. Make sure you take aspirin and not another kind of pain reliever, such as acetaminophen (Tylenol). When should you call for help? [id number] if you have symptoms of a heart attack. These may include:  · Chest pain or pressure, or a strange feeling in the chest.  · Sweating. · Shortness of breath. · Pain, pressure, or a strange feeling in the back, neck, jaw, or upper belly or in one or both shoulders or arms. · Lightheadedness or sudden weakness. · A fast or irregular heartbeat. After you call 911, the  may tell you to chew 1 adult-strength or 2 to 4 low-dose aspirin. Wait for an ambulance. Do not try to drive yourself. Watch closely for changes in your health, and be sure to contact your doctor if you have any problems. Where can you learn more? Go to https://[employer]. org and sign in to your [employer] account. Enter V213 in the [facility] box to learn more about \"A Healthy Heart: Care Instructions. \"     If you do not have an account, please click on the \"Sign Up Now\" link. Current as of: December 16, 2019               Content Version: 12.5  © 2658-0979 [employer]. Care instructions adapted under license by [facility] ([facility]).  If you have questions about a medical condition or this instruction, always ask your healthcare professional. [address]  any warranty or liability for your use of this information. How to take:  NITROGLYCERIN (Nitrostat) 0.4 mg tablets, sublingual.  Nitroglycerin is in a group of drugs called nitrates. Nitroglycerin dilates (widens) blood vessels, making it easier for blood to flow through them and easier for the heart to pump. Dosing Guidelines for Nitroglycerin Tablets  · At the start of an angina (chest pain) attack, place one tablet under the tongue or between the cheek and gum. Do not swallow or chew the tablet; let it dissolve on its own. If necessary, a second and third tablet may be used, with five minutes between using each tablet. If you use a third tablet and your chest pain continues, it is time to seek immediate medical attention. Call 911 immediately and have someone drive you to the emergency room. You may be having a heart attack or other serious heart problem. · To prevent angina from exercise or stress, use 1 tablet 5 to 10 minutes before the activity.

## 2021-04-22 NOTE — PROGRESS NOTES
[facility] [address] [place] [address]. [address] [facility]  [phone] office  [phone] fax      OFFICE VISIT:  2021    Briana Duran - : 1975  Reason For Visit:  Neena Flor is a 42 y.o. male who is here for Follow-up (Patient complains of shortness of breath and tightness in his chest times 3 days that comes and goes. ) and Shortness of Breath    History:   Diagnosis Orders   1. Coronary artery disease involving native coronary artery of native heart with angina pectoris ([address]     2. Mixed hyperlipidemia  EKG 12 lead   3. Essential hypertension  EKG 12 lead   4. History of coronary artery stent placement       The patient presents today for a problem visit. Neena Flor reports worsening precordial chest pain over the past few days. The discomfort occurs at rest and at times with exertion. He reports \"it seems to be worse the last few days. I took a NTG once and it seemed to help. \"  He also reports \"when I exert any I seem to be struggling to catch my breath. \"  No report of edema or orthopnea. The patient presented to the ED no 3/25/21 for chest pain. He was admitted for further cardiac workup and cardiology consult. EKG showed no ischemic changes, troponin trended and was negative. The patient had a cath on 19 which showed intermediate grade disease. Intermediate restenosis proximal to mid LAD stent. Intermediate grade restenosis ostial to proximal 1st diagonal stent. Patent mid RCA stent with normal EF. Subsequently, a Sarah Emiliano was completed which was negative for ischemia. Cardiology recommended continued medical management and with consideration of repeat heart catheterization in the future on outpatient basis. The patient reports heart rate still running fast at times. BP is well controlled on current regimen. The patient's PCP monitors cholesterol. The patient stopped smoking last November.     Subjective Jana Aranda denies orthopnea, paroxysmal nocturnal dyspnea, syncope, presyncope, sensed arrhythmia, edema and fatigue. The patient denies numbness or weakness to suggest cerebrovascular accident or transient ischemic attack. + precordial chest pain with and without exertion. Associated KUNZ. Symptoms worse past few days. Relief with NTG sl on one occasion. Maryann Guzman has the following history as [facility] in [facility] [place]:  Patient Active Problem List   Diagnosis Code    Coronary artery disease involving native coronary artery of native heart with angina pectoris (HCC) I25.119    History of coronary artery stent placement Z95.5    Mixed hyperlipidemia E78.2    Essential hypertension I10    Chronic systolic congestive heart failure (HCC) I50.22    Cigarette nicotine dependence without complication X34.573    Chest pain due to CAD ([address] I25.119     Past Medical History:   Diagnosis Date    CAD (coronary artery disease)     Hand numbness     Hand tingling     Heart attack ([address]     x 3    Hyperlipidemia     Movement disorder      Past Surgical History:   Procedure Laterality Date    CARDIAC CATHETERIZATION  10/25/15  [facility]    stent to RCA. Stent to LAD. angioplasaty to diagonal branch of LAD.     KNEE ARTHROSCOPY      LAMINECTOMY      NECK SURGERY      x 2      Family History   Problem Relation Age of Onset    Cancer Mother     Cancer Father      Social History     Tobacco Use    Smoking status: Former Smoker     Packs/day: 0.50     Years: 30.00     Pack years: 15.00     Types: Cigarettes     Quit date: 2019     Years since quittin.4    Smokeless tobacco: Never Used   Substance Use Topics    Alcohol use: No      Current Outpatient Medications   Medication Sig Dispense Refill    furosemide (LASIX) 20 MG tablet TAKE 1 TABLET BY MOUTH ONCE DAILY AS NEEDED FOR WEIGHT GAIN 30 tablet 1    clopidogrel (PLAVIX) 75 MG tablet Take 1 tablet by mouth once daily 90 tablet 1    carvedilol (COREG) 12.5 MG tablet Take 1 tablet by mouth 2 times daily (with meals) 180 tablet 3    isosorbide mononitrate (IMDUR) 60 MG extended release tablet Take 1 tablet by mouth once daily 90 tablet 3    losartan (COZAAR) 50 MG tablet Take 1 tablet by mouth daily 90 tablet 3    Evolocumab (REPATHA SURECLICK) 877 MG/ML SOAJ Inject 1 mL into the skin every 14 days 2 pen 11    atorvastatin (LIPITOR) 80 MG tablet Take 0.5 tablets by mouth daily 90 tablet 3    famotidine (PEPCID) 10 MG tablet Take 10 mg by mouth daily      nitroGLYCERIN (NITROSTAT) 0.4 MG SL tablet Place 1 tablet under the tongue every 5 minutes as needed for Chest pain 25 tablet 3    aspirin 81 MG chewable tablet Take 1 tablet by mouth daily 30 tablet 0    CYCLOBENZAPRINE HCL PO Take 10 mg by mouth nightly       No current facility-administered medications for this visit. Allergies: Lisinopril    Review of Systems  Constitutional  no appetite change, or unexpected weight change. No fever, chills or diaphoresis. No significant change in activity level or new onset of fatigue. HEENT  no significant rhinorrhea or epistaxis. No tinnitus or significant hearing loss. Eyes  no sudden vision change or amaurosis. No corneal arcus, xantholasma, subconjunctival hemorrhage or discharge. Respiratory  no significant wheezing, stridor, apnea or cough.  + KUNZ. Cardiovascular  no orthopnea or PND. No sensation of sustained arrythmia. No occurrence of slow heart rate. No palpitations. No claudication. + precordial chest pain with and without exertion. Associated KUNZ. Symptoms worse past few days. Gastrointestinal  no abdominal swelling or pain. No blood in stool. No severe constipation, diarrhea, nausea, or vomiting. Genitourinary  no dysuria, frequency, or urgency. No flank pain or hematuria. Musculoskeletal  no back pain or myalgia. No problems with gait. Extremities - no clubbing, cyanosis or extremity edema. Skin  no color change or rash. No pallor. No new surgical incision. Neurologic  no speech difficulty, facial asymmetry or lateralizing weakness. No seizures, presyncope or syncope. No significant dizziness. Hematologic  no easy bruising or excessive bleeding. Psychiatric  no severe anxiety or insomnia. No confusion. All other review of systems are negative. Objective  Vital Signs - /84   Pulse 94   Ht 5' 9\" (1.753 m)   Wt 252 lb (114.3 kg)   SpO2 100%   BMI 37.21 kg/m²   General - Xander Deras is alert, cooperative, and pleasant. Well groomed. No acute distress. Body habitus - Body mass index is 37.21 kg/m². HEENT  Head is normocephalic. No circumoral cyanosis. Dentition is normal.  EYES -   Lids normal without ptosis. No discharge, edema or subconjunctival hemorrhage. Neck - Symmetrical without apparent mass or lymphadenopathy. Respiratory - Normal respiratory effort without use of accessory muscles. Ausculatation reveals vesicular breath sounds without crackles, wheezes, rub or rhonchi. Cardiovascular  No jugular venous distention. Auscultation reveals regular rate and rhythm. No audible clicks, gallop or rub. No murmur. No lower extremity varicosities. No carotid bruits. Abdominal -  No visible distention, mass or pulsations. Extremities - No clubbing or cyanosis. No statis dermatitis or ulcers. No edema. Musculoskeletal -   No Osler's nodes. No kyphosis or scoliosis. Gait is even and regular without limp or shuffle. Ambulates without assistance. Skin -  Warm and dry; no rash or pallor. No new surgical wound. Neurological - No focal neurological deficits. Thought processes coherent. No apparent tremor. Oriented to person, place and time. Psychiatric -  Appropriate affect and mood. Data reviewed:  3/5/21 Lexiscan  Impression   Impression:   Fair effort tolerance with no EKG evidence of ischemia and no   reproducible chest pain.    There is no obvious infarct or ischemia, with a calculated ejection   fraction of 46 % . Suggest: Clinical correlation with consideration for medical   management. Signed by Dr Bobbi Oneal on 3/5/2021 2:56 PM     6/9/20 Lexiscan  Impression:   Submaximal stress test with no recurrent symptoms and without   significant EKG changes for ischemia   There is no obvious infarct or ischemia, with a calculated ejection   fraction of 55 %. Suggest: Clinical correlation is advised. Medical management if no   recurrent symptoms   Signed by Dr Bobbi Oneal on 6/10/2020 1:25 PM     11/16/19 cath   Intermediate grade disease. Intermediate restenosis and proximal to mid LAD stent. Intermediate grade restenosis in the ostial to proximal 1st diagonal   stent. Patent mid RCA stent. Normal LV ejection fraction. Recommendations    Medical management. Would maintain patient on Plavix going forward. Smoking cessation stressed.     Signatures   Electronically signed by Thiago Cannon MD(Performing Physician) on   11/19/2019 08:59    Lab Results   Component Value Date    WBC 10.3 03/05/2021    HGB 13.6 (L) 03/05/2021    HCT 41.2 (L) 03/05/2021    MCV 88.0 03/05/2021     03/05/2021     Lab Results   Component Value Date    TROPONINI <0.01 03/05/2021     Lab Results   Component Value Date     03/05/2021    K 3.9 03/05/2021     03/05/2021    CO2 24 03/05/2021    BUN 15 03/05/2021    CREATININE 1.0 03/05/2021    GLUCOSE 94 03/05/2021    CALCIUM 9.0 03/05/2021    PROT 6.7 03/05/2021    LABALBU 4.0 03/05/2021    BILITOT <0.2 03/05/2021    ALKPHOS 116 03/05/2021    AST 23 03/05/2021    ALT 59 (H) 03/05/2021    LABGLOM >60 03/05/2021    GFRAA >59 03/05/2021       Lab Results   Component Value Date    CHOL 95 (L) 03/05/2021    CHOL 101 (L) 10/15/2020    CHOL 143 (L) 06/29/2020     Lab Results   Component Value Date    TRIG 141 03/05/2021    TRIG 106 10/15/2020    TRIG 84 06/29/2020     Lab Results   Component Value Date    HDL 40 (L) 03/05/2021    HDL 43 (L) 10/15/2020    HDL 37 (L) 06/29/2020     Lab Results   Component Value Date    LDLCALC 27 03/05/2021    LDLCALC 37 10/15/2020    [address] 06/29/2020     EKG reviewed: NSR 89 bpm; no acute ischemic changes; occasional PVC. BP Readings from Last 3 Encounters:   04/22/21 132/84   03/05/21 136/86   03/01/21 128/82    Pulse Readings from Last 3 Encounters:   04/22/21 94   03/05/21 92   03/01/21 88        Wt Readings from Last 3 Encounters:   04/22/21 252 lb (114.3 kg)   03/05/21 244 lb 6.4 oz (110.9 kg)   03/01/21 245 lb (111.1 kg)     Assessment/Plan:   Diagnosis Orders   1. Coronary artery disease involving native coronary artery of native heart with angina pectoris ([facility]     2. Mixed hyperlipidemia  EKG 12 lead   3. Essential hypertension  EKG 12 lead   4. History of coronary artery stent placement       CAD s/p stenting with angina - seems to be worse over past 2 days -   Discussed proceeding with cath based upon cardiology notes at last ED visit. Patient agreeable. Procedure scheduled with Dr. Shannon Montenegro. Patient has NTG sl for prn use. Coreg increased to 12.5 mg (1) AM and (2) PM.  Imdur increased to 60 mg (1-1/2) tab daily. Advised to go to ED with worsened symptoms. HTN - normotensive on current regimen. Hyperlipidemia - LDL 27 on Lipitor and Repatha. Patient is compliant with medication regimen. Previous cardiac history and records reviewed. Continue current medical management for cardiac related condition. Continue other current medications as directed. Continue to follow up with primary care provider for non cardiac medical problems. If your primary care provider is outside of the [facility], please request that your labs be faxed to this office at [phone]. BP goal 130/80 or less. Call the office with any problems, questions or concerns at [phone]. Cardiology follow up as scheduled in [address] appointments. Educational included in patient instructions. Heart health.

## 2021-04-26 RX ORDER — NITROGLYCERIN 0.4 MG/1
TABLET SUBLINGUAL
Qty: 25 TABLET | Refills: 3 | Status: SHIPPED | OUTPATIENT
Start: 2021-04-26

## 2021-04-28 ENCOUNTER — OFFICE VISIT (OUTPATIENT)
Age: 46
End: 2021-04-28

## 2021-04-28 VITALS — TEMPERATURE: 97.2 F | OXYGEN SATURATION: 98 % | HEART RATE: 65 BPM

## 2021-04-28 DIAGNOSIS — Z11.59 SCREENING FOR VIRAL DISEASE: Primary | ICD-10-CM

## 2021-04-28 LAB — SARS-COV-2, PCR: NORMAL

## 2021-04-28 PROCEDURE — 99999 PR OFFICE/OUTPT VISIT,PROCEDURE ONLY: CPT | Performed by: NURSE PRACTITIONER

## 2021-04-29 ENCOUNTER — HOSPITAL ENCOUNTER (OUTPATIENT)
Dept: CARDIAC CATH/INVASIVE PROCEDURES | Age: 46
Discharge: HOME | End: 2021-04-29
Attending: INTERNAL MEDICINE | Admitting: INTERNAL MEDICINE
Payer: MEDICARE

## 2021-04-29 VITALS
TEMPERATURE: 97.9 F | WEIGHT: 252 LBS | DIASTOLIC BLOOD PRESSURE: 77 MMHG | OXYGEN SATURATION: 100 % | BODY MASS INDEX: 37.33 KG/M2 | HEIGHT: 69 IN | HEART RATE: 78 BPM | SYSTOLIC BLOOD PRESSURE: 98 MMHG | RESPIRATION RATE: 19 BRPM

## 2021-04-29 PROCEDURE — 99153 MOD SED SAME PHYS/QHP EA: CPT

## 2021-04-29 PROCEDURE — C1769 GUIDE WIRE: HCPCS

## 2021-04-29 PROCEDURE — 92928 PRQ TCAT PLMT NTRAC ST 1 LES: CPT

## 2021-04-29 PROCEDURE — 99152 MOD SED SAME PHYS/QHP 5/>YRS: CPT | Performed by: INTERNAL MEDICINE

## 2021-04-29 PROCEDURE — C1725 CATH, TRANSLUMIN NON-LASER: HCPCS

## 2021-04-29 PROCEDURE — 93571 IV DOP VEL&/PRESS C FLO 1ST: CPT | Performed by: INTERNAL MEDICINE

## 2021-04-29 PROCEDURE — 2580000003 HC RX 258: Performed by: INTERNAL MEDICINE

## 2021-04-29 PROCEDURE — 6360000004 HC RX CONTRAST MEDICATION: Performed by: INTERNAL MEDICINE

## 2021-04-29 PROCEDURE — 2709999900 HC NON-CHARGEABLE SUPPLY

## 2021-04-29 PROCEDURE — 6370000000 HC RX 637 (ALT 250 FOR IP)

## 2021-04-29 PROCEDURE — 2500000003 HC RX 250 WO HCPCS

## 2021-04-29 PROCEDURE — 92928 PRQ TCAT PLMT NTRAC ST 1 LES: CPT | Performed by: INTERNAL MEDICINE

## 2021-04-29 PROCEDURE — 6360000002 HC RX W HCPCS

## 2021-04-29 PROCEDURE — 93571 IV DOP VEL&/PRESS C FLO 1ST: CPT

## 2021-04-29 PROCEDURE — C1887 CATHETER, GUIDING: HCPCS

## 2021-04-29 PROCEDURE — 93458 L HRT ARTERY/VENTRICLE ANGIO: CPT

## 2021-04-29 PROCEDURE — C1874 STENT, COATED/COV W/DEL SYS: HCPCS

## 2021-04-29 PROCEDURE — 93458 L HRT ARTERY/VENTRICLE ANGIO: CPT | Performed by: INTERNAL MEDICINE

## 2021-04-29 PROCEDURE — 99152 MOD SED SAME PHYS/QHP 5/>YRS: CPT

## 2021-04-29 PROCEDURE — C1894 INTRO/SHEATH, NON-LASER: HCPCS

## 2021-04-29 RX ORDER — ACETAMINOPHEN 325 MG/1
650 TABLET ORAL EVERY 4 HOURS PRN
Status: DISCONTINUED | OUTPATIENT
Start: 2021-04-29 | End: 2021-04-29 | Stop reason: HOSPADM

## 2021-04-29 RX ORDER — SODIUM CHLORIDE 9 MG/ML
25 INJECTION, SOLUTION INTRAVENOUS PRN
Status: DISCONTINUED | OUTPATIENT
Start: 2021-04-29 | End: 2021-04-29 | Stop reason: HOSPADM

## 2021-04-29 RX ORDER — SODIUM CHLORIDE 0.9 % (FLUSH) 0.9 %
5-40 SYRINGE (ML) INJECTION PRN
Status: DISCONTINUED | OUTPATIENT
Start: 2021-04-29 | End: 2021-04-29 | Stop reason: SDUPTHER

## 2021-04-29 RX ORDER — SODIUM CHLORIDE 0.9 % (FLUSH) 0.9 %
5-40 SYRINGE (ML) INJECTION PRN
Status: DISCONTINUED | OUTPATIENT
Start: 2021-04-29 | End: 2021-04-29 | Stop reason: HOSPADM

## 2021-04-29 RX ORDER — ONDANSETRON 2 MG/ML
4 INJECTION INTRAMUSCULAR; INTRAVENOUS EVERY 6 HOURS PRN
Status: DISCONTINUED | OUTPATIENT
Start: 2021-04-29 | End: 2021-04-29 | Stop reason: HOSPADM

## 2021-04-29 RX ORDER — SODIUM CHLORIDE 9 MG/ML
INJECTION, SOLUTION INTRAVENOUS CONTINUOUS
Status: DISCONTINUED | OUTPATIENT
Start: 2021-04-29 | End: 2021-04-29 | Stop reason: HOSPADM

## 2021-04-29 RX ORDER — SODIUM CHLORIDE 9 MG/ML
25 INJECTION, SOLUTION INTRAVENOUS PRN
Status: DISCONTINUED | OUTPATIENT
Start: 2021-04-29 | End: 2021-04-29 | Stop reason: SDUPTHER

## 2021-04-29 RX ORDER — NITROGLYCERIN 0.4 MG/1
0.4 TABLET SUBLINGUAL EVERY 5 MIN PRN
Status: DISCONTINUED | OUTPATIENT
Start: 2021-04-29 | End: 2021-04-29 | Stop reason: HOSPADM

## 2021-04-29 RX ORDER — SODIUM CHLORIDE 9 MG/ML
INJECTION, SOLUTION INTRAVENOUS CONTINUOUS
Status: DISCONTINUED | OUTPATIENT
Start: 2021-04-29 | End: 2021-04-29 | Stop reason: SDUPTHER

## 2021-04-29 RX ORDER — SODIUM CHLORIDE 0.9 % (FLUSH) 0.9 %
5-40 SYRINGE (ML) INJECTION EVERY 12 HOURS SCHEDULED
Status: DISCONTINUED | OUTPATIENT
Start: 2021-04-29 | End: 2021-04-29 | Stop reason: SDUPTHER

## 2021-04-29 RX ORDER — SODIUM CHLORIDE 0.9 % (FLUSH) 0.9 %
5-40 SYRINGE (ML) INJECTION EVERY 12 HOURS SCHEDULED
Status: DISCONTINUED | OUTPATIENT
Start: 2021-04-29 | End: 2021-04-29 | Stop reason: HOSPADM

## 2021-04-29 RX ORDER — ASPIRIN 325 MG
325 TABLET ORAL ONCE
Status: DISCONTINUED | OUTPATIENT
Start: 2021-04-29 | End: 2021-04-29 | Stop reason: HOSPADM

## 2021-04-29 RX ADMIN — SODIUM CHLORIDE: 9 INJECTION, SOLUTION INTRAVENOUS at 11:12

## 2021-04-29 RX ADMIN — IOPAMIDOL 284 ML: 612 INJECTION, SOLUTION INTRAVENOUS at 14:56

## 2021-04-29 ASSESSMENT — ENCOUNTER SYMPTOMS
ABDOMINAL DISTENTION: 0
BACK PAIN: 0
SHORTNESS OF BREATH: 0
ABDOMINAL PAIN: 0
COUGH: 0
BLOOD IN STOOL: 0
CHEST TIGHTNESS: 1
VOMITING: 0
DIARRHEA: 0
WHEEZING: 0

## 2021-04-29 ASSESSMENT — PAIN DESCRIPTION - DESCRIPTORS: DESCRIPTORS: DISCOMFORT;DULL

## 2021-04-29 ASSESSMENT — PAIN DESCRIPTION - ORIENTATION: ORIENTATION: LEFT

## 2021-04-29 ASSESSMENT — PAIN DESCRIPTION - PROGRESSION: CLINICAL_PROGRESSION: NOT CHANGED

## 2021-04-29 ASSESSMENT — PAIN DESCRIPTION - ONSET: ONSET: GRADUAL

## 2021-04-29 ASSESSMENT — PAIN DESCRIPTION - LOCATION: LOCATION: CHEST

## 2021-04-29 NOTE — H&P
Patient:  Mir Martin                  1975  MRN: [id number]    PROBLEM LIST:    Patient Active Problem List    Diagnosis Date Noted    Chest pain due to CAD ([facility] 03/05/2021     Priority: Low    Cigarette nicotine dependence without complication 81/82/1049     Priority: Low    Chronic systolic congestive heart failure ([facility] 11/16/2019     Priority: Low    Essential hypertension 09/07/2016     Priority: Low    Coronary artery disease involving native coronary artery of native heart with angina pectoris ([facility] 12/23/2015     Priority: Low    History of coronary artery stent placement 12/23/2015     Priority: Low     Overview Note:     ESE to LAD on 10/25/15  Balloon angioplasty to 1st diagonal branch      Mixed hyperlipidemia 12/23/2015     Priority: Low     1. Coronary artery disease, prior MI 2015 with PCI LAD/diagonal bifurcation with stents [facility]), PCI mid RCA 10/25/2015, intermediate restenosis mid LAD/diagonal by catheterization 11/18/2019, ejection fraction 40 to 45%. 2.  Prior tobacco use stopped 11/2019.     PRESENTATION: Mir Martin is a 42y.o. year old male who presents with worsening chest pain occurring at rest and exertion with use of nitroglycerin with relief, currently on high-dose nitrates and beta-blocker with recent hospitalization 3/5/2021 with recurrent chest pain with a negative Lexiscan study now with worsening symptoms being referred by nurse practitioner for cardiac catheterization due to failure of medical management. Patient is a history of coronary artery disease with prior MI 2015 with PCI to LAD and diagonal as well as mid RCA with noted intermediate restenosis of the LAD and diagonal by catheterization 11/18/2019, ejection fraction 40 to 45%. REVIEW OF SYSTEMS:  Review of Systems   Constitutional: Negative for activity change, diaphoresis and fatigue. HENT: Negative for hearing loss, nosebleeds and tinnitus. Eyes: Negative for visual disturbance. Respiratory: Positive for chest tightness. Negative for cough, shortness of breath and wheezing. Cardiovascular: Positive for chest pain. Negative for palpitations and leg swelling. Gastrointestinal: Negative for abdominal distention, abdominal pain, blood in stool, diarrhea and vomiting. Endocrine: Negative for cold intolerance, heat intolerance, polydipsia, polyphagia and polyuria. Genitourinary: Negative for difficulty urinating, flank pain and hematuria. Musculoskeletal: Negative for arthralgias, back pain, joint swelling and myalgias. Skin: Negative for pallor and rash. Neurological: Negative for dizziness, seizures, syncope and headaches. Psychiatric/Behavioral: Negative for behavioral problems and dysphoric mood. The patient is not nervous/anxious. Past Medical History:      Diagnosis Date    CAD (coronary artery disease)     Hand numbness     Hand tingling     Heart attack ([address]     x 3    Hyperlipidemia     Movement disorder        Past Surgical History:      Procedure Laterality Date    CARDIAC CATHETERIZATION  [facility]/25/15  [facility]    stent to RCA. Stent to LAD. angioplasaty to diagonal branch of LAD.  KNEE ARTHROSCOPY      LAMINECTOMY      NECK SURGERY      x 2        Medications Prior to Admission:    Prior to Admission medications    Medication Sig Start Date End Date Taking?  Authorizing Provider   nitroGLYCERIN (NITROSTAT) 0.4 MG SL tablet 1 TABLET UNDER THE TONGUE EVERY 5 MINUTES AS NEEDED FOR CHEST PAIN 4/26/21   ENDER Dahl - CNP   furosemide (LASIX) 20 MG tablet TAKE 1 TABLET BY MOUTH ONCE DAILY AS NEEDED FOR WEIGHT GAIN 4/13/21   ENDER Solares   clopidogrel (PLAVIX) 75 MG tablet Take 1 tablet by mouth once daily 4/13/21   ENDER Solares   carvedilol (COREG) 12.5 MG tablet Take 1 tablet by mouth 2 times daily (with meals) 3/1/21   ENDER Solares   isosorbide mononitrate (IMDUR) 60 MG extended release tablet Take 1 tablet by mouth once daily 3/1/21   ENDER Yao   losartan (COZAAR) 50 MG tablet Take 1 tablet by mouth daily 20   ENDER Hamlin   Evolocumab (REPATHA SURECLICK) 100 MG/ML SOAJ Inject 1 mL into the skin every 14 days 20   ENDER Hamlin   atorvastatin (LIPITOR) 80 MG tablet Take 0.5 tablets by mouth daily 20   ENDER Hamlin   famotidine (PEPCID) 10 MG tablet Take 10 mg by mouth daily    Historical Provider, MD   aspirin 81 MG chewable tablet Take 1 tablet by mouth daily 19   Jerome Schwartz MD   CYCLOBENZAPRINE HCL PO Take 10 mg by mouth nightly    Historical Provider, MD       Allergies:  Lisinopril    Past Social History:  Social History     Socioeconomic History    Marital status:      Spouse name: Not on file    Number of children: Not on file    Years of education: Not on file    Highest education level: Not on file   Occupational History    Not on file   Social Needs    Financial resource strain: Not on file    Food insecurity     Worry: Not on file     Inability: Not on file   Superbly needs     Medical: Not on file     Non-medical: Not on file   Tobacco Use    Smoking status: Former Smoker     Packs/day: 0.50     Years: 30.00     Pack years: 15.00     Types: Cigarettes     Quit date: 2019     Years since quittin.4    Smokeless tobacco: Never Used   Substance and Sexual Activity    Alcohol use: No    Drug use: No    Sexual activity: Not on file   Lifestyle    Physical activity     Days per week: Not on file     Minutes per session: Not on file    Stress: Not on file   Relationships    Social connections     Talks on phone: Not on file     Gets together: Not on file     Attends Latter day service: Not on file     Active member of club or organization: Not on file     Attends meetings of clubs or organizations: Not on file     Relationship status: Not on file    Intimate partner violence     Fear of current or ex partner: Not on file     Emotionally abused: Not on file     Physically abused: Not on file     Forced sexual activity: Not on file   Other Topics Concern    Not on file   Social History Narrative    Not on file       Family History:       Problem Relation Age of Onset    Cancer Mother     Cancer Father      Physical Exam:    Vitals: There were no vitals taken for this visit. 24HR INTAKE/OUTPUT:  No intake or output data in the 24 hours ending 04/29/21 0757    Physical Exam  Constitutional:       Appearance: He is well-developed. HENT:      Mouth/Throat:      Pharynx: No oropharyngeal exudate. Eyes:      General: No scleral icterus. Right eye: No discharge. Left eye: No discharge. Neck:      Thyroid: No thyromegaly. Vascular: No JVD. Cardiovascular:      Rate and Rhythm: Normal rate and regular rhythm. Heart sounds: No murmur. No friction rub. No gallop. Pulmonary:      Effort: No respiratory distress. Breath sounds: No stridor. No wheezing or rales. Abdominal:      General: Bowel sounds are normal. There is no distension. Palpations: Abdomen is soft. There is no mass. Tenderness: There is no abdominal tenderness. There is no guarding or rebound. Musculoskeletal:         General: No deformity. Skin:     General: Skin is warm. Coloration: Skin is not pale. Findings: No erythema or rash. Neurological:      Mental Status: He is alert and oriented to person, place, and time. Motor: No abnormal muscle tone. Coordination: Coordination normal.      Deep Tendon Reflexes: Reflexes normal.         LAB DATA:  CBC: No results for input(s): WBC, HGB, PLT in the last 72 hours. BMP:  No results for input(s): NA, K, CL, CO2, BUN, CREATININE, GLUCOSE in the last 72 hours. Hepatic: No results for input(s): AST, ALT, ALB, BILITOT, ALKPHOS in the last 72 hours.   CK, CKMB, Troponin: @LABRCNT (CKTOTAL:3, CKMB:3, TROPONINI:3)@  Pro-BNP: No results for input(s): BNP in the last 72 hours. Lipids: No results for input(s): CHOL, HDL in the last 72 hours. Invalid input(s): LDL  ABGs: No results for input(s): PHART, DBB4XGZ, PO2ART, QPX1KSA, BEART, HGBAE, Z3OKTRPO, CARBOXHGBART, 02THERAPY in the last 72 hours. INR: No results for input(s): INR in the last 72 hours. A1c:Invalid input(s): HEMOGLOBIN A1C  URINALYSIS:   Lab Results   Component Value Date    NITRU NEGATIVE 10/26/2015    GLUCOSEU NEGATIVE 10/26/2015     -----------------------------------------------------------------  IMAGING:  No orders to display     Treadmill  Nuclear Stress Test Report   Procedure date: 03/05/21   Indications: chest pain   Procedure: Stress was performed using a standard Macario protocol. Vital   signs and EKG were monitored. Technetium-99 sestamibi was injected in   divided doses, approximately 10 mCi and 30 mCi respectively for rest   and stress imaging. The patient was discharged in stable condition. Results: Patient had Graydon Cancel during exercise that resolved in   recovery. Baseline EKG showed normal sinus rhythm with nonspecific   ST/T changes. During stress there was  nonspecific ST changes. Changes   resolved in recovery. Baseline and peak blood pressures were 161/108,   and 168/103 respectively.  Baseline and peak heart rates were 91 and    146 respectively. The patient exercised for 5:58 minutes on a standard   Macario protocol obtaining target heart rate. Lexiscan/Cardiolyte Nuclear Medicine Report   Date of Procedure: 3/5/2021   The patient was injected with 63.09 millicuries (mCi) of Technetium   (Tc99m).  After an appropriate level of stress the patient was   re-injected with 37.1 millicuries (mCi) of Technetium (Tc99m).  Repeat   gated images were then performed per standard protocol. Findings:   1.  Analysis of the the stress and rest images reveals no obvious   defects on stress and rest images.    2.  Analysis of the gated images reveals mildly reduced left   ventricular function with a calculated ejection fraction of 46 %.         Impression   Impression:   Fair effort tolerance with no EKG evidence of ischemia and no   reproducible chest pain. There is no obvious infarct or ischemia, with a calculated ejection   fraction of 46 % . Suggest: Clinical correlation with consideration for medical   management. Signed by Dr Bobbi Oneal on 3/5/2021 2:56 PM         Assessment and Recommendations: This is a 42 y.o. year old male with past medical history of known coronary artery disease with prior MI 2015 with prior PCI to LAD/diagonal and mid RCA with catheterization 11/2019 with moderate mid LAD/first diagonal restenosis), moderate LV systolic dysfunction, ejection fraction 40-45% long-standing prior tobacco use stopped 11/2019, recent admission 3/5/2021 with negative Lexiscan study for chest pain discharged on medical management with recurrent symptoms occurring at rest and exertion with reported relief with nitrates despite medical management being referred for cardiac catheterization. Risks, benefits, alternatives of cardiac catheterization/PCI discussed with the patient and full informed consent obtained.   Acceptable Mallampati score  Consent for moderate conscious sedation  ASA 3            Electronically signed by Bobbi Oneal MD on 4/29/2021 at 7:57 AM

## 2021-04-29 NOTE — PROGRESS NOTES
Cardiac catheterization preliminary note:    RCA distal 90% stenosis treated with drug-eluting stent. Diagonal proximal stent with 80% stenosis. iFR equals 0.83 consistent with obstructive lesion. Treated with drug-eluting stent. LAD with intermediate disease. iFR equals 0.92 not consistent with obstructive lesion. Continue medical management. Plavix uninterrupted at this time.

## 2021-04-29 NOTE — PROGRESS NOTES
Patient requiring PICC line for antibiotic administration long-term. Refusing insertion right arm is complains of frozen shoulder. Left sided pacemaker at high risk of infection if PICC line should get infected. Would not suggest placing PICC line on left arm if at all possible. Would suggest either sedation or anesthesia support to help in this.

## 2021-04-29 NOTE — PROGRESS NOTES
AVS EXPLAINED & GIVEN. AMBULATED WITH PT TO CAR FOR DISCHARGE HOME PER WIFE.  ALL BELONGINGS WITH PT.

## 2021-04-29 NOTE — PROGRESS NOTES
PT OOB WITH RN @ SIDE. PT AMBULATED TO BATHROOM TO VOID, THEN AMBULATED IN ROOM WITHOUT DIFFICULTY. PT NOW SITTING UP IN BEDSIDE CHAIR MOVING RT ARM AROUND SLIGHTLY AS INSTRUCTED. WIFE PRESENT.

## 2021-04-30 NOTE — CONSULTS
Cardiac Rehab MI/PTCA/Stent education packet was sent to the patient's address on record. Handouts included were titled; \"Home Instructions Following a Cardiac Event\", \"Cardiac Home Exercise Program - Phase I\", \"Risk Factors for Heart Disease and Stroke\" and \"Cardiac Diet/Low Cholesterol\". Patient was instructed to contact [facility] or the hospital nearest their residence for the opportunity to enroll in Phase II Outpatient Cardiac Rehab.

## 2021-05-02 ENCOUNTER — HOSPITAL ENCOUNTER (OUTPATIENT)
Age: 46
Setting detail: OBSERVATION
Discharge: HOME | End: 2021-05-03
Attending: HOSPITALIST
Payer: MEDICARE

## 2021-05-02 PROBLEM — I25.9 CHEST PAIN DUE TO MYOCARDIAL ISCHEMIA: Status: ACTIVE | Noted: 2021-05-02

## 2021-05-02 LAB
ALBUMIN SERPL-MCNC: 4.1 G/DL (ref 3.5–5.2)
ALP BLD-CCNC: 135 U/L (ref 40–130)
ALT SERPL-CCNC: 51 U/L (ref 5–41)
ANION GAP SERPL CALCULATED.3IONS-SCNC: 12 MMOL/L (ref 7–19)
APTT: 24.4 SEC (ref 26–36.2)
AST SERPL-CCNC: 30 U/L (ref 5–40)
BASOPHILS ABSOLUTE: 0.1 K/UL (ref 0–0.2)
BASOPHILS RELATIVE PERCENT: 1.2 % (ref 0–1)
BILIRUB SERPL-MCNC: <0.2 MG/DL (ref 0.2–1.2)
BUN BLDV-MCNC: 12 MG/DL (ref 6–20)
CALCIUM SERPL-MCNC: 9.6 MG/DL (ref 8.6–10)
CHLORIDE BLD-SCNC: 100 MMOL/L (ref 98–111)
CHOLESTEROL, TOTAL: 100 MG/DL (ref 160–199)
CO2: 27 MMOL/L (ref 22–29)
CREAT SERPL-MCNC: 1 MG/DL (ref 0.5–1.2)
D DIMER: <0.27 UG/ML FEU (ref 0–0.48)
EOSINOPHILS ABSOLUTE: 0.2 K/UL (ref 0–0.6)
EOSINOPHILS RELATIVE PERCENT: 2 % (ref 0–5)
GFR AFRICAN AMERICAN: >59
GFR NON-AFRICAN AMERICAN: >60
GLUCOSE BLD-MCNC: 84 MG/DL (ref 74–109)
HBA1C MFR BLD: 5.8 % (ref 4–6)
HCT VFR BLD CALC: 43.4 % (ref 42–52)
HDLC SERPL-MCNC: 40 MG/DL (ref 55–121)
HEMOGLOBIN: 14.1 G/DL (ref 14–18)
IMMATURE GRANULOCYTES #: 0 K/UL
INR BLD: 0.99 (ref 0.88–1.18)
LDL CHOLESTEROL CALCULATED: 20 MG/DL
LYMPHOCYTES ABSOLUTE: 3.1 K/UL (ref 1.1–4.5)
LYMPHOCYTES RELATIVE PERCENT: 34.1 % (ref 20–40)
MAGNESIUM: 2 MG/DL (ref 1.6–2.6)
MCH RBC QN AUTO: 29.1 PG (ref 27–31)
MCHC RBC AUTO-ENTMCNC: 32.5 G/DL (ref 33–37)
MCV RBC AUTO: 89.5 FL (ref 80–94)
MONOCYTES ABSOLUTE: 0.7 K/UL (ref 0–0.9)
MONOCYTES RELATIVE PERCENT: 7.2 % (ref 0–10)
NEUTROPHILS ABSOLUTE: 5 K/UL (ref 1.5–7.5)
NEUTROPHILS RELATIVE PERCENT: 55.2 % (ref 50–65)
PDW BLD-RTO: 13.7 % (ref 11.5–14.5)
PHOSPHORUS: 4.4 MG/DL (ref 2.5–4.5)
PLATELET # BLD: 209 K/UL (ref 130–400)
PMV BLD AUTO: 10.8 FL (ref 9.4–12.4)
POTASSIUM REFLEX MAGNESIUM: 4.2 MMOL/L (ref 3.5–5)
PROTHROMBIN TIME: 13 SEC (ref 12–14.6)
RBC # BLD: 4.85 M/UL (ref 4.7–6.1)
SODIUM BLD-SCNC: 139 MMOL/L (ref 136–145)
T4 FREE: 0.98 NG/DL (ref 0.93–1.7)
TOTAL PROTEIN: 7.2 G/DL (ref 6.6–8.7)
TRIGL SERPL-MCNC: 201 MG/DL (ref 0–149)
TROPONIN: 0.02 NG/ML (ref 0–0.03)
TSH REFLEX FT4: 5.3 UIU/ML (ref 0.35–5.5)
WBC # BLD: 9.1 K/UL (ref 4.8–10.8)

## 2021-05-02 PROCEDURE — G0379 DIRECT REFER HOSPITAL OBSERV: HCPCS

## 2021-05-02 PROCEDURE — 2580000003 HC RX 258: Performed by: HOSPITALIST

## 2021-05-02 PROCEDURE — 84100 ASSAY OF PHOSPHORUS: CPT

## 2021-05-02 PROCEDURE — 83036 HEMOGLOBIN GLYCOSYLATED A1C: CPT

## 2021-05-02 PROCEDURE — 85379 FIBRIN DEGRADATION QUANT: CPT

## 2021-05-02 PROCEDURE — 80053 COMPREHEN METABOLIC PANEL: CPT

## 2021-05-02 PROCEDURE — 85025 COMPLETE CBC W/AUTO DIFF WBC: CPT

## 2021-05-02 PROCEDURE — G0378 HOSPITAL OBSERVATION PER HR: HCPCS

## 2021-05-02 PROCEDURE — 83735 ASSAY OF MAGNESIUM: CPT

## 2021-05-02 PROCEDURE — 6370000000 HC RX 637 (ALT 250 FOR IP): Performed by: HOSPITALIST

## 2021-05-02 PROCEDURE — 84439 ASSAY OF FREE THYROXINE: CPT

## 2021-05-02 PROCEDURE — 84484 ASSAY OF TROPONIN QUANT: CPT

## 2021-05-02 PROCEDURE — 36415 COLL VENOUS BLD VENIPUNCTURE: CPT

## 2021-05-02 PROCEDURE — 84443 ASSAY THYROID STIM HORMONE: CPT

## 2021-05-02 PROCEDURE — 80061 LIPID PANEL: CPT

## 2021-05-02 PROCEDURE — 85730 THROMBOPLASTIN TIME PARTIAL: CPT

## 2021-05-02 PROCEDURE — 85610 PROTHROMBIN TIME: CPT

## 2021-05-02 RX ORDER — SODIUM CHLORIDE 0.9 % (FLUSH) 0.9 %
5-40 SYRINGE (ML) INJECTION PRN
Status: DISCONTINUED | OUTPATIENT
Start: 2021-05-02 | End: 2021-05-03 | Stop reason: HOSPADM

## 2021-05-02 RX ORDER — ASPIRIN 81 MG/1
81 TABLET, CHEWABLE ORAL DAILY
Status: DISCONTINUED | OUTPATIENT
Start: 2021-05-03 | End: 2021-05-03 | Stop reason: HOSPADM

## 2021-05-02 RX ORDER — SODIUM CHLORIDE 9 MG/ML
25 INJECTION, SOLUTION INTRAVENOUS PRN
Status: DISCONTINUED | OUTPATIENT
Start: 2021-05-02 | End: 2021-05-03 | Stop reason: HOSPADM

## 2021-05-02 RX ORDER — ONDANSETRON 2 MG/ML
4 INJECTION INTRAMUSCULAR; INTRAVENOUS EVERY 6 HOURS PRN
Status: DISCONTINUED | OUTPATIENT
Start: 2021-05-02 | End: 2021-05-03 | Stop reason: HOSPADM

## 2021-05-02 RX ORDER — ATORVASTATIN CALCIUM 80 MG/1
80 TABLET, FILM COATED ORAL NIGHTLY
Status: DISCONTINUED | OUTPATIENT
Start: 2021-05-02 | End: 2021-05-03 | Stop reason: HOSPADM

## 2021-05-02 RX ORDER — ISOSORBIDE MONONITRATE 60 MG/1
60 TABLET, EXTENDED RELEASE ORAL DAILY
Status: DISCONTINUED | OUTPATIENT
Start: 2021-05-02 | End: 2021-05-03 | Stop reason: HOSPADM

## 2021-05-02 RX ORDER — CARVEDILOL 12.5 MG/1
12.5 TABLET ORAL 2 TIMES DAILY WITH MEALS
Status: DISCONTINUED | OUTPATIENT
Start: 2021-05-02 | End: 2021-05-03 | Stop reason: HOSPADM

## 2021-05-02 RX ORDER — ACETAMINOPHEN 650 MG/1
650 SUPPOSITORY RECTAL EVERY 6 HOURS PRN
Status: DISCONTINUED | OUTPATIENT
Start: 2021-05-02 | End: 2021-05-03 | Stop reason: HOSPADM

## 2021-05-02 RX ORDER — CYCLOBENZAPRINE HCL 10 MG
10 TABLET ORAL NIGHTLY
Status: DISCONTINUED | OUTPATIENT
Start: 2021-05-02 | End: 2021-05-03 | Stop reason: HOSPADM

## 2021-05-02 RX ORDER — CLOPIDOGREL BISULFATE 75 MG/1
75 TABLET ORAL DAILY
Status: DISCONTINUED | OUTPATIENT
Start: 2021-05-03 | End: 2021-05-03 | Stop reason: HOSPADM

## 2021-05-02 RX ORDER — ACETAMINOPHEN 325 MG/1
650 TABLET ORAL EVERY 6 HOURS PRN
Status: DISCONTINUED | OUTPATIENT
Start: 2021-05-02 | End: 2021-05-03 | Stop reason: HOSPADM

## 2021-05-02 RX ORDER — SODIUM CHLORIDE 0.9 % (FLUSH) 0.9 %
5-40 SYRINGE (ML) INJECTION EVERY 12 HOURS SCHEDULED
Status: DISCONTINUED | OUTPATIENT
Start: 2021-05-02 | End: 2021-05-03 | Stop reason: HOSPADM

## 2021-05-02 RX ORDER — ONDANSETRON 4 MG/1
4 TABLET, ORALLY DISINTEGRATING ORAL EVERY 8 HOURS PRN
Status: DISCONTINUED | OUTPATIENT
Start: 2021-05-02 | End: 2021-05-03 | Stop reason: HOSPADM

## 2021-05-02 RX ORDER — LOSARTAN POTASSIUM 25 MG/1
50 TABLET ORAL DAILY
Status: DISCONTINUED | OUTPATIENT
Start: 2021-05-02 | End: 2021-05-03 | Stop reason: HOSPADM

## 2021-05-02 RX ORDER — FAMOTIDINE 20 MG/1
10 TABLET, FILM COATED ORAL DAILY
Status: DISCONTINUED | OUTPATIENT
Start: 2021-05-02 | End: 2021-05-03 | Stop reason: HOSPADM

## 2021-05-02 RX ORDER — NITROGLYCERIN 0.4 MG/1
0.4 TABLET SUBLINGUAL EVERY 5 MIN PRN
Status: DISCONTINUED | OUTPATIENT
Start: 2021-05-02 | End: 2021-05-03 | Stop reason: HOSPADM

## 2021-05-02 RX ADMIN — SODIUM CHLORIDE, PRESERVATIVE FREE 10 ML: 5 INJECTION INTRAVENOUS at 20:37

## 2021-05-02 RX ADMIN — CYCLOBENZAPRINE 10 MG: 10 TABLET, FILM COATED ORAL at 20:42

## 2021-05-02 RX ADMIN — ATORVASTATIN CALCIUM 80 MG: 80 TABLET, FILM COATED ORAL at 20:42

## 2021-05-02 RX ADMIN — NITROGLYCERIN 0.4 MG: 0.4 TABLET, ORALLY DISINTEGRATING SUBLINGUAL at 20:27

## 2021-05-02 SDOH — ECONOMIC STABILITY: FOOD INSECURITY: WITHIN THE PAST 12 MONTHS, THE FOOD YOU BOUGHT JUST DIDN'T LAST AND YOU DIDN'T HAVE MONEY TO GET MORE.: NOT ASKED

## 2021-05-02 SDOH — ECONOMIC STABILITY: TRANSPORTATION INSECURITY
IN THE PAST 12 MONTHS, HAS THE LACK OF TRANSPORTATION KEPT YOU FROM MEDICAL APPOINTMENTS OR FROM GETTING MEDICATIONS?: NOT ASKED

## 2021-05-02 SDOH — HEALTH STABILITY: MENTAL HEALTH: HOW MANY STANDARD DRINKS CONTAINING ALCOHOL DO YOU HAVE ON A TYPICAL DAY?: NOT ASKED

## 2021-05-02 SDOH — ECONOMIC STABILITY: FOOD INSECURITY: WITHIN THE PAST 12 MONTHS, YOU WORRIED THAT YOUR FOOD WOULD RUN OUT BEFORE YOU GOT MONEY TO BUY MORE.: NOT ASKED

## 2021-05-02 ASSESSMENT — ENCOUNTER SYMPTOMS
DIARRHEA: 0
COUGH: 1
SHORTNESS OF BREATH: 1
ABDOMINAL PAIN: 0

## 2021-05-03 VITALS
DIASTOLIC BLOOD PRESSURE: 70 MMHG | HEIGHT: 69 IN | BODY MASS INDEX: 36.73 KG/M2 | OXYGEN SATURATION: 94 % | WEIGHT: 248 LBS | SYSTOLIC BLOOD PRESSURE: 100 MMHG | HEART RATE: 81 BPM | RESPIRATION RATE: 16 BRPM | TEMPERATURE: 97.2 F

## 2021-05-03 LAB
ANION GAP SERPL CALCULATED.3IONS-SCNC: 10 MMOL/L (ref 7–19)
BUN BLDV-MCNC: 15 MG/DL (ref 6–20)
CALCIUM SERPL-MCNC: 9.3 MG/DL (ref 8.6–10)
CHLORIDE BLD-SCNC: 104 MMOL/L (ref 98–111)
CO2: 25 MMOL/L (ref 22–29)
CREAT SERPL-MCNC: 1.1 MG/DL (ref 0.5–1.2)
EKG P AXIS: 51 DEGREES
EKG P AXIS: 58 DEGREES
EKG P-R INTERVAL: 148 MS
EKG P-R INTERVAL: 150 MS
EKG Q-T INTERVAL: 376 MS
EKG Q-T INTERVAL: 394 MS
EKG QRS DURATION: 82 MS
EKG QRS DURATION: 86 MS
EKG QTC CALCULATION (BAZETT): 412 MS
EKG QTC CALCULATION (BAZETT): 422 MS
EKG T AXIS: 0 DEGREES
EKG T AXIS: 24 DEGREES
GFR AFRICAN AMERICAN: >59
GFR NON-AFRICAN AMERICAN: >60
GLUCOSE BLD-MCNC: 99 MG/DL (ref 74–109)
HCT VFR BLD CALC: 42 % (ref 42–52)
HEMOGLOBIN: 13.8 G/DL (ref 14–18)
MCH RBC QN AUTO: 29.1 PG (ref 27–31)
MCHC RBC AUTO-ENTMCNC: 32.9 G/DL (ref 33–37)
MCV RBC AUTO: 88.6 FL (ref 80–94)
PDW BLD-RTO: 13.6 % (ref 11.5–14.5)
PLATELET # BLD: 195 K/UL (ref 130–400)
PMV BLD AUTO: 10.9 FL (ref 9.4–12.4)
POTASSIUM REFLEX MAGNESIUM: 4.2 MMOL/L (ref 3.5–5)
RBC # BLD: 4.74 M/UL (ref 4.7–6.1)
SODIUM BLD-SCNC: 139 MMOL/L (ref 136–145)
TROPONIN: <0.01 NG/ML (ref 0–0.03)
TROPONIN: <0.01 NG/ML (ref 0–0.03)
WBC # BLD: 8.5 K/UL (ref 4.8–10.8)

## 2021-05-03 PROCEDURE — G0378 HOSPITAL OBSERVATION PER HR: HCPCS

## 2021-05-03 PROCEDURE — 85027 COMPLETE CBC AUTOMATED: CPT

## 2021-05-03 PROCEDURE — 36415 COLL VENOUS BLD VENIPUNCTURE: CPT

## 2021-05-03 PROCEDURE — 93010 ELECTROCARDIOGRAM REPORT: CPT | Performed by: INTERNAL MEDICINE

## 2021-05-03 PROCEDURE — 80048 BASIC METABOLIC PNL TOTAL CA: CPT

## 2021-05-03 PROCEDURE — 84484 ASSAY OF TROPONIN QUANT: CPT

## 2021-05-03 PROCEDURE — 2580000003 HC RX 258: Performed by: HOSPITALIST

## 2021-05-03 PROCEDURE — 99214 OFFICE O/P EST MOD 30 MIN: CPT | Performed by: INTERNAL MEDICINE

## 2021-05-03 PROCEDURE — 6370000000 HC RX 637 (ALT 250 FOR IP): Performed by: HOSPITALIST

## 2021-05-03 PROCEDURE — 93005 ELECTROCARDIOGRAM TRACING: CPT | Performed by: HOSPITALIST

## 2021-05-03 RX ORDER — HYDROXYZINE PAMOATE 25 MG/1
25 CAPSULE ORAL 3 TIMES DAILY PRN
Status: DISCONTINUED | OUTPATIENT
Start: 2021-05-03 | End: 2021-05-03 | Stop reason: HOSPADM

## 2021-05-03 RX ORDER — NICOTINE 21 MG/24HR
1 PATCH, TRANSDERMAL 24 HOURS TRANSDERMAL DAILY
Status: DISCONTINUED | OUTPATIENT
Start: 2021-05-03 | End: 2021-05-03 | Stop reason: HOSPADM

## 2021-05-03 RX ADMIN — CLOPIDOGREL BISULFATE 75 MG: 75 TABLET ORAL at 08:22

## 2021-05-03 RX ADMIN — CARVEDILOL 12.5 MG: 12.5 TABLET, FILM COATED ORAL at 08:22

## 2021-05-03 RX ADMIN — LOSARTAN POTASSIUM 50 MG: 25 TABLET, FILM COATED ORAL at 08:22

## 2021-05-03 RX ADMIN — ASPIRIN 81 MG: 81 TABLET, CHEWABLE ORAL at 08:22

## 2021-05-03 RX ADMIN — FAMOTIDINE 10 MG: 20 TABLET, FILM COATED ORAL at 08:22

## 2021-05-03 RX ADMIN — ISOSORBIDE MONONITRATE 60 MG: 60 TABLET, EXTENDED RELEASE ORAL at 08:22

## 2021-05-03 RX ADMIN — SODIUM CHLORIDE, PRESERVATIVE FREE 10 ML: 5 INJECTION INTRAVENOUS at 08:24

## 2021-05-03 ASSESSMENT — ENCOUNTER SYMPTOMS
WHEEZING: 0
SHORTNESS OF BREATH: 0
DIARRHEA: 0
ABDOMINAL DISTENTION: 0
BLOOD IN STOOL: 0
BACK PAIN: 0
COUGH: 0
CHEST TIGHTNESS: 1
VOMITING: 0
ABDOMINAL PAIN: 0

## 2021-05-03 NOTE — PROGRESS NOTES
Spoke with patient and wife about ordered fredi scan today and attempted to get consent. Patient is upset about his situation and wishes to speak with Dr. Lucinda Mcfarland again and does not wish to proceed with fredi scan. Have notified nuclear medicine and Dr. Pricila Plata.      Electronically signed by Elizabet Yanes RN on 5/3/2021 at 10:57 AM

## 2021-05-03 NOTE — DISCHARGE SUMMARY
Discharge Summary    Date:5/3/2021        Patient Nathanel Bernheim     YOB: 1975     Age:46 y.o. Admit Date:5/2/2021   Admission Condition:poor   Discharged Condition:fair  Discharge Date: 05/03/21     Discharge Diagnoses   Active Problems:    Mixed hyperlipidemia    Essential hypertension    Cigarette nicotine dependence without complication    Chest pain due to myocardial ischemia    Morbid obesity due to excess calories ([address] (BMI>35 with comorbidities)  Resolved Problems:    * No resolved hospital problems. [facility] Stay   Narrative of Hospital Course: Patient admitted 112, 53-year-old obese  male with a known past medical history of coronary artery disease, hyperlipidemia, hypertension, GERD, status post recent cardiac catheterization 4/29/21 with placement of 2 drug-eluting stents. Patient presents back to the emergency room due to ongoing chest discomfort. Troponin trend 0.02, less than 0.01, less than 0.01. Cardiology consultation was placed, there are plans for nuc med stress test however patient did not want to pursue with this modality. Assessed by cardiology, ultimately patient has decided to pursue second opinion. Patient is stable for discharge to follow-up with cardiologist of choice. Patient to follow-up with PCP in 1 week for ongoing coordination of care as well as hospital discharge. Education modalities have been attached after visit summary. Patient will be benefited from cessation of tobacco products, also should be worked up for underlying anxiety. Consultants:   IP CONSULT TO CARDIOLOGY    Time Spent on Discharge:  45  minutes were spent in patient examination, evaluation, counseling as well as medication reconciliation, prescriptions for required medications, discharge plan and follow up.       Surgeries/Procedures Performed:       Treatments:   analgesia: acetaminophen, cardiac meds: Lipitor, Coreg, Imdur, Cozaar, anticoagulation: ASA, Plavix and LMW heparin and electrolyte monitoring and stabilization    Significant Diagnostic Studies:   Recent Labs:  CBC:   Lab Results   Component Value Date    WBC 8.5 05/03/2021    RBC 4.74 05/03/2021    HGB 13.8 05/03/2021    HCT 42.0 05/03/2021    MCV 88.6 05/03/2021    MCH 29.1 05/03/2021    MCHC 32.9 05/03/2021    RDW 13.6 05/03/2021     05/03/2021     BMP:    Lab Results   Component Value Date    GLUCOSE 99 05/03/2021     05/03/2021    K 4.2 05/03/2021     05/03/2021    CO2 25 05/03/2021    ANIONGAP 10 05/03/2021    BUN 15 05/03/2021    CREATININE 1.1 05/03/2021    CALCIUM 9.3 05/03/2021    LABGLOM >60 05/03/2021    GFRAA >59 05/03/2021     HFP:    Lab Results   Component Value Date    PROT 7.2 05/02/2021     CMP:    Lab Results   Component Value Date    GLUCOSE 99 05/03/2021     05/03/2021    K 4.2 05/03/2021     05/03/2021    CO2 25 05/03/2021    BUN 15 05/03/2021    CREATININE 1.1 05/03/2021    ANIONGAP 10 05/03/2021    ALKPHOS 135 05/02/2021    ALT 51 05/02/2021    AST 30 05/02/2021    BILITOT <0.2 05/02/2021    LABALBU 4.1 05/02/2021    LABGLOM >60 05/03/2021    GFRAA >59 05/03/2021    PROT 7.2 05/02/2021    CALCIUM 9.3 05/03/2021     PT/INR:    Lab Results   Component Value Date    PROTIME 13.0 05/02/2021    INR 0.99 05/02/2021     PTT:   Lab Results   Component Value Date    APTT 24.4 05/02/2021     FLP:    Lab Results   Component Value Date    CHOL 100 05/02/2021    TRIG 201 05/02/2021    HDL 40 05/02/2021     U/A:    Lab Results   Component Value Date    COLORU YELLOW 10/26/2015    PHUR 5.5 10/26/2015    PROTEINU NEGATIVE 10/26/2015    GLUCOSEU NEGATIVE 10/26/2015    KETUA NEGATIVE 10/26/2015    BILIRUBINUR NEGATIVE 10/26/2015    UROBILINOGEN 0.2 10/26/2015    NITRU NEGATIVE 10/26/2015    LEUKOCYTESUR NEGATIVE 10/26/2015     TSH:    Lab Results   Component Value Date    TSH 1.600 02/03/2021       Radiology Last 7 Days:  No results found.     Physical Exam  Vitals signs reviewed. Constitutional:       Appearance: He is not ill-appearing. HENT:      Head: Normocephalic. Nose: Nose normal.   Eyes:      Conjunctiva/sclera: Conjunctivae normal.   Cardiovascular:      Rate and Rhythm: Normal rate. Pulses: Normal pulses. Heart sounds: Normal heart sounds. Pulmonary:      Effort: Pulmonary effort is normal. No respiratory distress. Breath sounds: No wheezing. Abdominal:      Tenderness: There is no abdominal tenderness. There is no guarding or rebound. Musculoskeletal:      Comments: Chest pain nonreproducible upon palpation   Skin:     General: Skin is warm. Neurological:      Mental Status: He is alert and oriented to person, place, and time. Mental status is at baseline. Cranial Nerves: No cranial nerve deficit.    Psychiatric:         Mood and Affect: Mood normal.           Discharge Plan   Disposition: Home    Provider Follow-Up:   [address]  [phone]    On 5/26/2021  10:15 AM    Maysaurav [place], [address] [facility],[address]  [address] [facility] [phone]    On 5/10/2021  Hospital follow-up appointment is scheduled for 5/10 at 3:30pm.         Patient Instructions   Diet: cardiac diet    Activity: activity as tolerated      Discharge Medications         Medication List      CHANGE how you take these medications    atorvastatin 80 MG tablet  Commonly known as: LIPITOR  Take 0.5 tablets by mouth daily  What changed: when to take this     carvedilol 12.5 MG tablet  Commonly known as: COREG  Take 1 tablet by mouth 2 times daily (with meals)  What changed:   · when to take this  · additional instructions     Cherise SureClick 641 MG/ML Soaj  Generic drug: Evolocumab  Inject 1 mL into the skin every 14 days  What changed: additional instructions        CONTINUE taking these medications    aspirin 81 MG chewable tablet  Take 1 tablet by mouth daily     clopidogrel 75 MG tablet  Commonly

## 2021-05-03 NOTE — DISCHARGE SUMMARY
Patient Information:  Patient: Danya Blackman  MRN: [id number]   Darío Resendez: [de-identified]  YOB: 1975  Admit Date: 5/2/2021      Primary Care Physician: SAMARIA Stanley  Advance Directive: Full Code  Health Care Proxy: his wife, Mrs. Tamra Adkins, [phone]        SUBJECTIVE:    EP Sign Out:  Chest Pain  Transfer In  Dr Rowan Mistry to see in AM  Last cath on 29th April, done by same  Presented at OSH C/O CP    HPI:  Mr. Sharonda Alvarez is a pleasant 42year old gent known to me from prior admission. He has had chest pains for weeks, He had a cardiac cath with two stents placed [id number] and states that nothing changed. He states that his prior Mis felt like someone squeezing hi collar bone. The pain tonight started to reach to the collar bone at about 2:30pm to 3:00pm. He took NG that \"relxed it for a little while\". The character was \"like a digging\". He states that it hit again when he was on the elevator on the way up. Review of Systems:   Review of Systems   Constitutional: Positive for fatigue. Negative for diaphoresis. HENT: Negative for sneezing. Respiratory: Positive for cough and shortness of breath. Cardiovascular: Positive for chest pain. HAS chest pressure   Gastrointestinal: Negative for abdominal pain and diarrhea. Genitourinary: Negative for dysuria. Musculoskeletal: Negative for arthralgias and myalgias. Neurological: Positive for light-headedness. Negative for syncope and weakness. Denied changes to gustation, denied changes to olfaction   Psychiatric/Behavioral: Negative for confusion. Past Medical History:   Diagnosis Date    CAD (coronary artery disease)     CHF (congestive heart failure) (HCC)     GERD (gastroesophageal reflux disease)     Hand numbness     Hand tingling     Heart attack (HCC)     x 3    Hiatal hernia     Hyperlipidemia     Impaired mobility     pt uses cane.  IMPAIRED MOBILITY R/T NECK SURGERY    Morbid obesity due to acute distress. Appearance: Normal appearance. He is obese. He is not ill-appearing or toxic-appearing. HENT:      Head: Normocephalic and atraumatic. Nose: No congestion or rhinorrhea. Eyes:      General:         Right eye: No discharge. Left eye: No discharge. Neck:      Musculoskeletal: Neck supple. Comments: Trachea appears midline  Cardiovascular:      Rate and Rhythm: Normal rate and regular rhythm. Heart sounds: No murmur. No friction rub. No gallop. Pulmonary:      Effort: Pulmonary effort is normal. No respiratory distress. Breath sounds: No stridor. No wheezing, rhonchi or rales. Chest:      Chest wall: No tenderness. Abdominal:      General: Bowel sounds are normal.      Tenderness: There is no abdominal tenderness. There is no guarding or rebound. Musculoskeletal:      Comments: No wasting of muscle stores, has increased fat stores   Skin:     General: Skin is warm. Comments: nondiaphoretic   Neurological:      Mental Status: He is alert. Cranial Nerves: No dysarthria. Motor: No tremor or seizure activity. Psychiatric:         Mood and Affect: Mood normal.         Behavior: Behavior normal.         LABORATORY DATA:  Collected just now and pending results         ASESSMENTS & PLANS:    Chest Pain:  Hx known CAD, morbid obesity:  Tele  Admit to cardiac kong as OBSERVATION status patient  Cardio consult in AM  Trend TnI  Mag, Phos, TSH, Lipid Panel, HbA1c - will run as add ons to ED labs if able  CBC and BMP with Reflex for following AM  ASA as per protocol (324-325mg chewed STAT unless on 81ASA daily in which case 162mg chewed STAT if not yet given, THEN from following AM 81mg Daily. ) - ASA PO QDay at home  Statin as per protocol (High intensity Statin, if already on high intensity Stain of Simvasttain 80 continue it, if Lipitor 40+ then Lipitor 80 (if less than 40 just double so long as >=40), if Rosuvastatin 20mg+ then Rosuvastatin 40mg PO QHS (if less than 20 just double so long as >=20mg) - Lipitor 80mg PO QHS at home  NG SL PRN CP  TTE will be deferred to cardio  Continue Imdur 60mg PO QDay  contnue Plavix 75mg PO QDay  Continue Losartan 50mg PO QDay  Continue Carvedilol 12.5mg PO BID    Chronic Medical Problems:  Continue current Regimen as indicated  Continue flexeril 10mg PO QHS  Continue famotidine 10mg PO QDay    Supportive and Prophylactic Txx:  DVT PPx: Lovenox 40mg SQ BID (BID as Mass >100kg)  GI (PUD) PPx: Not indicated  PT: as per age  DIET CARDIAC; No Caffeine  Diet NPO, After Midnight Exceptions are: Sips of Water with Meds  sodium chloride flush, sodium chloride, acetaminophen **OR** acetaminophen, ondansetron **OR** ondansetron, nitroGLYCERIN      Care time of >45 minutes  Pt seen/examined and admitted to OBServation status. Inpatient status is used for patients with an expected LOS extending past two midnights due to medical therapy and or critical care needs, otherwise patients are placed to OBServation status. Signed:  Electronically signed by Anthony Traore MD on 5/2/21 at 20:29 CDT.

## 2021-05-04 ENCOUNTER — CARE COORDINATION (OUTPATIENT)
Dept: CASE MANAGEMENT | Age: 46
End: 2021-05-04

## 2021-05-04 DIAGNOSIS — I20.0 UNSTABLE ANGINA (HCC): Primary | ICD-10-CM

## 2021-05-04 PROCEDURE — 1111F DSCHRG MED/CURRENT MED MERGE: CPT | Performed by: NURSE PRACTITIONER

## 2021-05-04 NOTE — CARE COORDINATION
verbalizes understanding of administration of home medications. Advised obtaining a 90-day supply of all daily and as-needed medications. Covid Risk Education    Patient has following risk factors of: heart failure CAD  Education provided regarding infection prevention, and signs and symptoms of COVID-19 and when to seek medical attention with family who verbalized understanding. Discussed exposure protocols and quarantine From CDC: Are you at higher risk for severe illness?   and given an opportunity for questions and concerns. The family agrees to contact the COVID-19 hotline [phone] or PCP office for questions related to COVID-19. For more information on steps you can take to protect yourself, see CDC's How to Protect Yourself     Was patient discharged with a pulse oximeter? No Discussed and confirmed pulse oximeter discharge instructions and when to notify provider or seek emergency care. Discussed follow-up appointments. If no appointment was previously scheduled, appointment scheduling offered: Yes. Is follow up appointment scheduled within 7 days of discharge? Yes  Non-[facility] follow up appointment(s):     Plan for follow-up call in 3-5 days based on severity of symptoms and risk factors. Plan for next call: Temple margrett, chest pain  CTN provided contact information for future needs.           Care Transitions 24 Hour Call    Do you have any ongoing symptoms?: No  Do you have a copy of your discharge instructions?: Yes  Do you have all of your prescriptions and are they filled?: No  Have you been contacted by a [address]?: No  Have you scheduled your follow up appointment?: Yes  How are you going to get to your appointment?: Car - family or friend to transport  Were you discharged with any Home Care or Post Acute Services: No  Do you feel like you have everything you need to keep you well at home?: Yes  Care Transitions Interventions         Follow Up : Spoke with patient wife today as

## 2021-05-04 NOTE — CONSULTS
[facility]  Cardiology Consult      Requesting MD:  Ese att. providers found   Admit Status:  Observation [104]       History obtained from:   ? Patient  ? Other (specify):     PROBLEM LIST:    Patient Active Problem List    Diagnosis Date Noted    Unstable angina [facility])      Priority: High    Chest pain due to myocardial ischemia 05/02/2021     Priority: Low    Morbid obesity due to excess calories ([facility] (BMI>35 with comorbidities)      Priority: Low    Chest pain due to CAD ([facility] 03/05/2021     Priority: Low    Cigarette nicotine dependence without complication 03/42/6786     Priority: Low    Chronic systolic congestive heart failure ([facility] 11/16/2019     Priority: Low    Essential hypertension 09/07/2016     Priority: Low    Coronary artery disease involving native coronary artery of native heart with angina pectoris ([facility] 12/23/2015     Priority: Low    History of coronary artery stent placement 12/23/2015     Priority: Low     Overview Note:     ESE to LAD on 10/25/15  Balloon angioplasty to 1st diagonal branch      Mixed hyperlipidemia 12/23/2015     Priority: Low     1.  Coronary artery disease, prior MI 2015 with PCI LAD/diagonal bifurcation with stents [facility]), PCI mid RCA 10/25/2015, intermediate restenosis mid LAD/diagonal by catheterization 11/18/2019, ejection fraction 40 to 45% with recurrent symptomatology with catheterization 4/29/2021 with obstructive diagonal restenosis and new distal RCA stenosis both treated with ESE. 2.  Prior tobacco use stopped 11/2019.     PRESENTATION: Cathy Minaya is a 42y.o. year old male who presents to outside hospital with recurrent ongoing symptoms of chest pain which he describes as sharp usually lasting 30 seconds or less but sometimes a minute with no significant change since his PCI pain spreads to his neck and collarbone. Reports some benefit with nitroglycerin. Troponins have been negative.   No significant ST-T changes on EKG. REVIEW OF SYSTEMS:  Review of Systems   Constitutional: Negative for activity change, diaphoresis and fatigue. HENT: Negative for hearing loss, nosebleeds and tinnitus. Eyes: Negative for visual disturbance. Respiratory: Positive for chest tightness. Negative for cough, shortness of breath and wheezing. Cardiovascular: Positive for chest pain. Negative for palpitations and leg swelling. Gastrointestinal: Negative for abdominal distention, abdominal pain, blood in stool, diarrhea and vomiting. Endocrine: Negative for cold intolerance, heat intolerance, polydipsia, polyphagia and polyuria. Genitourinary: Negative for difficulty urinating, flank pain and hematuria. Musculoskeletal: Negative for arthralgias, back pain, joint swelling and myalgias. Skin: Negative for pallor and rash. Neurological: Negative for dizziness, seizures, syncope and headaches. Psychiatric/Behavioral: Negative for behavioral problems and dysphoric mood. The patient is not nervous/anxious. Past Medical History:      Diagnosis Date    CAD (coronary artery disease)     CHF [facility]congestive heart failure) ([facility])     GERD (gastroesophageal reflux disease)     Hand numbness     Hand tingling     Heart attack ([facility])     x 3    Hiatal hernia     Hyperlipidemia     Impaired mobility     pt uses cane. IMPAIRED MOBILITY R/T NECK SURGERY    Morbid obesity due to excess calories ([address] (BMI>35 with comorbidities)     Movement disorder     Pyloric stenosis        Past Surgical History:      Procedure Laterality Date    CARDIAC CATHETERIZATION  10/25/15  [address]    stent to RCA. Stent to LAD. angioplasaty to diagonal branch of LAD.     CORONARY ANGIOPLASTY WITH STENT PLACEMENT  2013    CORONARY ANGIOPLASTY WITH STENT PLACEMENT  04/29/2021    [id number] with 2 stents placed by Dr Carolyne Pallas ARTHROSCOPY Right     LAMINECTOMY  2019   [facility]    \"as a baby, 5336\"   [address]  2018 has 2 steps in to the front door        Family History:       Problem Relation Age of Onset    Cancer Mother         thyroid cancer & breast cancer, both as primaries    Other Mother         tobacco use    Cancer Father         kidney cancer on right    Other Father         tobacco use    Other Sister         Julio, Thyroid problems    No Known Problems Brother     Amblyopia Son     No Known Problems Son     Other Daughter         pyloric stenosis       Home Meds:  Prior to Admission medications    Medication Sig Start Date End Date Taking?  Authorizing Provider   nitroGLYCERIN (NITROSTAT) 0.4 MG SL tablet 1 TABLET UNDER THE TONGUE EVERY 5 MINUTES AS NEEDED FOR CHEST PAIN 4/26/21  Yes ENDER Alas - CNP   clopidogrel (PLAVIX) 75 MG tablet Take 1 tablet by mouth once daily 4/13/21  Yes ENDER Chadwick   carvedilol (COREG) 12.5 MG tablet Take 1 tablet by mouth 2 times daily (with meals)  Patient taking differently: Take 12.5 mg by mouth See Admin Instructions 25 MG IN AM & 12.5 MG PM 3/1/21  Yes ENDER Chadwick   isosorbide mononitrate (IMDUR) 60 MG extended release tablet Take 1 tablet by mouth once daily 3/1/21  Yes ENDER Chadwick   losartan (COZAAR) 50 MG tablet Take 1 tablet by mouth daily 11/2/20  Yes ENDER Salazar   atorvastatin (LIPITOR) 80 MG tablet Take 0.5 tablets by mouth daily  Patient taking differently: Take 40 mg by mouth nightly  7/8/20  Yes ENDER Salazar   famotidine (PEPCID) 10 MG tablet Take 10 mg by mouth daily   Yes Historical Provider, MD   aspirin 81 MG chewable tablet Take 1 tablet by mouth daily 11/20/19  Yes Russell Dave MD   CYCLOBENZAPRINE HCL PO Take 10 mg by mouth nightly   Yes Historical Provider, MD   furosemide (LASIX) 20 MG tablet TAKE 1 TABLET BY MOUTH ONCE DAILY AS NEEDED FOR WEIGHT GAIN 4/13/21   ENDER Chadwick   Evolocumab (REPATHA SURECLICK) 375 MG/ML SOAJ Inject 1 mL into the skin every 14 days  Patient taking differently: Inject 1 mL into the skin every 14 days EVERY OTHER FRIDAY 7/9/20   ENDER Mary       Current Meds:      Current Infused Meds:      Physical Exam:  Vitals:    05/03/21 1356   BP: 100/70   Pulse:    Resp:    Temp:    SpO2:        Intake/Output Summary (Last 24 hours) at 5/3/2021 1913  Last data filed at 5/2/2021 2037  Gross per 24 hour   Intake 10 ml   Output --   Net 10 ml     Estimated body mass index is 36.62 kg/m² as calculated from the following:    Height as of this encounter: 5' 9\" (1.753 m). Weight as of this encounter: 248 lb (112.5 kg). Physical Exam  Constitutional:       Appearance: He is well-developed. HENT:      Mouth/Throat:      Pharynx: No oropharyngeal exudate. Eyes:      General: No scleral icterus. Right eye: No discharge. Left eye: No discharge. Neck:      Thyroid: No thyromegaly. Vascular: No JVD. Cardiovascular:      Rate and Rhythm: Normal rate and regular rhythm. Heart sounds: No murmur. No friction rub. No gallop. Comments: No JVD  No edema  No significant systolic or diastolic murmurs noted    Pulmonary:      Effort: No respiratory distress. Breath sounds: No stridor. No wheezing or rales. Comments: Left-sided chest wall tenderness noted  Good air entry bilaterally with no crepitations or wheezes  Abdominal:      General: Bowel sounds are normal. There is no distension. Palpations: Abdomen is soft. There is no mass. Tenderness: There is no abdominal tenderness. There is no guarding or rebound. Comments: Soft, nontender with no palpable organomegaly   Musculoskeletal:         General: No deformity. Skin:     General: Skin is warm. Coloration: Skin is not pale. Findings: No erythema or rash. Neurological:      Mental Status: He is alert and oriented to person, place, and time. Motor: No abnormal muscle tone.       Coordination: Coordination normal.      Deep Tendon Reflexes: obtain cardiac catheterization films and adequate information when he does go for second opinion. 2.  Continue medical management. Compliance with medication stressed.       Electronically signed by Elisha Rendon MD on 5/3/2021 at 7:13 PM

## 2021-05-07 ENCOUNTER — CARE COORDINATION (OUTPATIENT)
Dept: CASE MANAGEMENT | Age: 46
End: 2021-05-07

## 2021-05-07 NOTE — CARE COORDINATION
[facility] Transitions Follow Up Call    2021    Patient: Pam Ayala  Patient : 1975   MRN: [id number]  Reason for Admission: Chest Pain  Discharge Date: 5/3/21 RARS: Readmission Risk Score: 10         Spoke with: N/A    Care Transitions Subsequent and Final Call    Subsequent and Final Calls  Care Transitions Interventions  Other Interventions: Follow Up  Future Appointments   Date Time Provider Nemesio Keith   5/10/2021  3:30 PM ENDER Wagoner [facility]   2021 10:15 AM ENDER Hoff [facility]   2021 10:30 AM SAMARIA Maradiaga [facility]   10/6/2021  3:45 PM MD LUBA Garcia [facility]io [facility]     Attempted to make contact with patient/caregiver for a routine follow up call without success. Left a HIPAA compliant message regarding intent of call and call back information. Will try again at a later time.       Sondra Rooney RN

## 2021-05-12 DIAGNOSIS — I10 ESSENTIAL HYPERTENSION: ICD-10-CM

## 2021-05-12 DIAGNOSIS — I50.22 CHRONIC SYSTOLIC CONGESTIVE HEART FAILURE (HCC): ICD-10-CM

## 2021-05-12 RX ORDER — ISOSORBIDE MONONITRATE 60 MG/1
90 TABLET, EXTENDED RELEASE ORAL DAILY
Qty: 90 TABLET | Refills: 5 | Status: SHIPPED | OUTPATIENT
Start: 2021-05-12 | End: 2021-05-14 | Stop reason: SDUPTHER

## 2021-05-12 RX ORDER — CARVEDILOL 25 MG/1
25 TABLET ORAL 2 TIMES DAILY WITH MEALS
Qty: 180 TABLET | Refills: 3 | Status: SHIPPED | OUTPATIENT
Start: 2021-05-12 | End: 2022-05-16

## 2021-05-14 ENCOUNTER — CARE COORDINATION (OUTPATIENT)
Dept: CASE MANAGEMENT | Age: 46
End: 2021-05-14

## 2021-05-14 RX ORDER — ISOSORBIDE MONONITRATE 60 MG/1
TABLET, EXTENDED RELEASE ORAL
Qty: 90 TABLET | Refills: 5 | Status: SHIPPED | OUTPATIENT
Start: 2021-05-14 | End: 2021-11-29 | Stop reason: SDUPTHER

## 2021-05-14 NOTE — CARE COORDINATION
[facility] Transitions Follow Up Call    2021    Patient: Kendall Quintero  Patient : 1975   MRN: [id number]  Reason for Admission:   Discharge Date: 5/3/21 RARS: Readmission Risk Score: 10         Spoke with: N/A    Care Transitions Subsequent and Final Call    Subsequent and Final Calls  Care Transitions Interventions  Other Interventions: Follow Up : Attempted to make contact with Stephanie Hollins for Covid f/u call without success. Unable to leave a message regarding intent of call and call back information. Will resolve calls today.         Future Appointments   Date Time Provider Nemesio Keith   2021 10:15 AM ENDER Garcia [facility] [place]   2021  3:30 PM ENDER Young [facility]   10/6/2021  3:45 PM MD LUBA Dc [facility] Cardio [facility]       Kavitha Brown RN

## 2021-06-01 RX ORDER — EVOLOCUMAB 140 MG/ML
INJECTION, SOLUTION SUBCUTANEOUS
Qty: 2 ML | Refills: 5 | Status: SHIPPED | OUTPATIENT
Start: 2021-06-01

## 2021-06-09 ENCOUNTER — OFFICE VISIT (OUTPATIENT)
Dept: INTERNAL MEDICINE | Age: 46
End: 2021-06-09
Payer: MEDICARE

## 2021-06-09 VITALS
OXYGEN SATURATION: 98 % | DIASTOLIC BLOOD PRESSURE: 80 MMHG | WEIGHT: 242 LBS | HEART RATE: 91 BPM | SYSTOLIC BLOOD PRESSURE: 140 MMHG | HEIGHT: 69 IN | BODY MASS INDEX: 35.84 KG/M2

## 2021-06-09 DIAGNOSIS — I10 ESSENTIAL HYPERTENSION: ICD-10-CM

## 2021-06-09 DIAGNOSIS — K02.9 DENTAL CARIES: ICD-10-CM

## 2021-06-09 DIAGNOSIS — M25.511 CHRONIC RIGHT SHOULDER PAIN: ICD-10-CM

## 2021-06-09 DIAGNOSIS — G89.29 CHRONIC RIGHT SHOULDER PAIN: ICD-10-CM

## 2021-06-09 DIAGNOSIS — H65.93 BILATERAL NON-SUPPURATIVE OTITIS MEDIA: ICD-10-CM

## 2021-06-09 DIAGNOSIS — R06.83 SNORING: ICD-10-CM

## 2021-06-09 DIAGNOSIS — E78.2 MIXED HYPERLIPIDEMIA: ICD-10-CM

## 2021-06-09 DIAGNOSIS — I25.119 CORONARY ARTERY DISEASE INVOLVING NATIVE CORONARY ARTERY OF NATIVE HEART WITH ANGINA PECTORIS (HCC): ICD-10-CM

## 2021-06-09 DIAGNOSIS — E55.9 VITAMIN D DEFICIENCY: Primary | ICD-10-CM

## 2021-06-09 LAB
ALBUMIN SERPL-MCNC: 4.2 G/DL (ref 3.5–5.2)
ALP BLD-CCNC: 111 U/L (ref 40–130)
ALT SERPL-CCNC: 56 U/L (ref 5–41)
ANION GAP SERPL CALCULATED.3IONS-SCNC: 11 MMOL/L (ref 7–19)
AST SERPL-CCNC: 31 U/L (ref 5–40)
BACTERIA: ABNORMAL /HPF
BASOPHILS ABSOLUTE: 0.1 K/UL (ref 0–0.2)
BASOPHILS RELATIVE PERCENT: 0.8 % (ref 0–1)
BILIRUB SERPL-MCNC: 0.3 MG/DL (ref 0.2–1.2)
BILIRUBIN URINE: NEGATIVE
BLOOD, URINE: NEGATIVE
BUN BLDV-MCNC: 12 MG/DL (ref 6–20)
CALCIUM SERPL-MCNC: 10 MG/DL (ref 8.6–10)
CHLORIDE BLD-SCNC: 102 MMOL/L (ref 98–111)
CHOLESTEROL, TOTAL: 93 MG/DL (ref 160–199)
CLARITY: CLEAR
CO2: 26 MMOL/L (ref 22–29)
COLOR: YELLOW
CREAT SERPL-MCNC: 1.1 MG/DL (ref 0.5–1.2)
EOSINOPHILS ABSOLUTE: 0.1 K/UL (ref 0–0.6)
EOSINOPHILS RELATIVE PERCENT: 2.2 % (ref 0–5)
EPITHELIAL CELLS, UA: ABNORMAL /HPF
GFR AFRICAN AMERICAN: >59
GFR NON-AFRICAN AMERICAN: >60
GLUCOSE BLD-MCNC: 104 MG/DL (ref 74–109)
GLUCOSE URINE: NEGATIVE MG/DL
HCT VFR BLD CALC: 41.9 % (ref 42–52)
HDLC SERPL-MCNC: 42 MG/DL (ref 55–121)
HEMOGLOBIN: 13.9 G/DL (ref 14–18)
IMMATURE GRANULOCYTES #: 0 K/UL
KETONES, URINE: NEGATIVE MG/DL
LDL CHOLESTEROL CALCULATED: 28 MG/DL
LEUKOCYTE ESTERASE, URINE: ABNORMAL
LYMPHOCYTES ABSOLUTE: 2.5 K/UL (ref 1.1–4.5)
LYMPHOCYTES RELATIVE PERCENT: 39.1 % (ref 20–40)
MCH RBC QN AUTO: 29.3 PG (ref 27–31)
MCHC RBC AUTO-ENTMCNC: 33.2 G/DL (ref 33–37)
MCV RBC AUTO: 88.4 FL (ref 80–94)
MONOCYTES ABSOLUTE: 0.5 K/UL (ref 0–0.9)
MONOCYTES RELATIVE PERCENT: 7.7 % (ref 0–10)
NEUTROPHILS ABSOLUTE: 3.2 K/UL (ref 1.5–7.5)
NEUTROPHILS RELATIVE PERCENT: 49.9 % (ref 50–65)
NITRITE, URINE: NEGATIVE
PDW BLD-RTO: 14.1 % (ref 11.5–14.5)
PH UA: 5.5 (ref 5–8)
PLATELET # BLD: 209 K/UL (ref 130–400)
PMV BLD AUTO: 10.9 FL (ref 9.4–12.4)
POTASSIUM SERPL-SCNC: 4.7 MMOL/L (ref 3.5–5)
PROTEIN UA: ABNORMAL MG/DL
RBC # BLD: 4.74 M/UL (ref 4.7–6.1)
RBC UA: ABNORMAL /HPF (ref 0–2)
SODIUM BLD-SCNC: 139 MMOL/L (ref 136–145)
SPECIFIC GRAVITY UA: 1.02 (ref 1–1.03)
TOTAL PROTEIN: 7 G/DL (ref 6.6–8.7)
TRIGL SERPL-MCNC: 117 MG/DL (ref 0–149)
TSH SERPL DL<=0.05 MIU/L-ACNC: 1.66 UIU/ML (ref 0.27–4.2)
UROBILINOGEN, URINE: 0.2 E.U./DL
VITAMIN D 25-HYDROXY: 21.3 NG/ML
WBC # BLD: 6.5 K/UL (ref 4.8–10.8)
WBC UA: ABNORMAL /HPF (ref 0–5)

## 2021-06-09 PROCEDURE — 1036F TOBACCO NON-USER: CPT | Performed by: NURSE PRACTITIONER

## 2021-06-09 PROCEDURE — 99214 OFFICE O/P EST MOD 30 MIN: CPT | Performed by: NURSE PRACTITIONER

## 2021-06-09 PROCEDURE — G8427 DOCREV CUR MEDS BY ELIG CLIN: HCPCS | Performed by: NURSE PRACTITIONER

## 2021-06-09 PROCEDURE — G8417 CALC BMI ABV UP PARAM F/U: HCPCS | Performed by: NURSE PRACTITIONER

## 2021-06-09 RX ORDER — ERGOCALCIFEROL 1.25 MG/1
50000 CAPSULE ORAL WEEKLY
Qty: 30 CAPSULE | Refills: 3 | Status: SHIPPED | OUTPATIENT
Start: 2021-06-09

## 2021-06-09 RX ORDER — AMOXICILLIN 500 MG/1
500 CAPSULE ORAL 3 TIMES DAILY
COMMUNITY
Start: 2021-06-05

## 2021-06-09 NOTE — ASSESSMENT & PLAN NOTE
His coronary artery disease with 5 stents the lightest was May he is on Plavix and he has had a recent heart cath as well that was clear with patent stents.   He is on I am sure he went to [facility] and then was started on Ranexa as well chest pain has resolved

## 2021-06-09 NOTE — ASSESSMENT & PLAN NOTE
This is around the back of the scapula is tender to touch and he is having some radicular pain to the right hand.   We will try massage therapy and warm moist compresses as well as Aspercreme [place] dream Biofreeze

## 2021-06-09 NOTE — PROGRESS NOTES
[facility] PHYSICIAN SERVICES  [facility] INTERNAL MEDICINE  [address] [address] [place] [address]  Dept: [phone]  Dept Fax: [phone]  Loc: [phone]    Rajat Humphries (:  1975) is a 42 y.o. male,Established patient, here for evaluation of the following chief complaint(s): Other (4 month f/u, needing letter for medical clearance for dental work, left ear infection,b/p been high , sleep apnea is getting bad , right shoulder blade causing pain )      Rajat Humphries is a 42 y.o. male who presents today for his medical conditions/complaints as noted below. Rajat Humphries is c/sissy Other (4 month f/u, needing letter for medical clearance for dental work, left ear infection,b/p been high , sleep apnea is getting bad , right shoulder blade causing pain )        HPI:     Chief Complaint   Patient presents with    Other     4 month f/u, needing letter for medical clearance for dental work, left ear infection,b/p been high , sleep apnea is getting bad , right shoulder blade causing pain      HPI   1. Hypertension stable on caret 25  And losartan 50 and lasix prn with weight gain of >3 pounds in a day   2   Dental issues is to have tooth pulled; he has been having a lot of  Pain; so his B? P has been up  3    CAD  With 5 stents; With the latest in May  He is on plavix    He is on imdur as well; [facility] and [facility]exa as well and it seem to help   4. Pain in right shoulder and the pain is going down his right arm as well; He cant sleep due to the pain in the shoulder ; they have tried muscle relaxants and massages   5. Decreased hearing with recent otitis media   6.   SNORING    TOLD HAS  SLEEP apnea      Past Medical History:   Diagnosis Date    CAD (coronary artery disease)     [facility]est[facility])     GERD (gastroesophageal reflux disease)     Hand numbness     Hand tingling     Heart attack ([facility] [address]     x 3    Hiatal hernia     Hyperlipidemia     vitamin D (ERGOCALCIFEROL) 1.25 MG (99925 UT) CAPS capsule Take 1 capsule by mouth once a week 30 capsule 3    REPATHA SURECLICK 448 MG/ML SOAJ INJECT 1 ML SUBCUTANEOUSLY EVERY TWO WEEKS 2 mL 5    isosorbide mononitrate (IMDUR) 60 MG extended release tablet Take 1 and half tablet by mouth once daily 90 tablet 5    carvedilol (COREG) 25 MG tablet Take 1 tablet by mouth 2 times daily (with meals) 180 tablet 3    nitroGLYCERIN (NITROSTAT) 0.4 MG SL tablet 1 TABLET UNDER THE TONGUE EVERY 5 MINUTES AS NEEDED FOR CHEST PAIN 25 tablet 3    furosemide (LASIX) 20 MG tablet TAKE 1 TABLET BY MOUTH ONCE DAILY AS NEEDED FOR WEIGHT GAIN 30 tablet 1    clopidogrel (PLAVIX) 75 MG tablet Take 1 tablet by mouth once daily 90 tablet 1    losartan (COZAAR) 50 MG tablet Take 1 tablet by mouth daily 90 tablet 3    atorvastatin (LIPITOR) 80 MG tablet Take 0.5 tablets by mouth daily (Patient taking differently: Take 40 mg by mouth nightly ) 90 tablet 3    famotidine (PEPCID) 10 MG tablet Take 10 mg by mouth daily      aspirin 81 MG chewable tablet Take 1 tablet by mouth daily 30 tablet 0    CYCLOBENZAPRINE HCL PO Take 10 mg by mouth nightly       No current facility-administered medications for this visit.      Allergies   Allergen Reactions    Lisinopril Other (See Comments)     cough       Health Maintenance   Topic Date Due    Hepatitis C screen  Never done    HIV screen  Never done    DTaP/Tdap/Td vaccine (1 - Tdap) Never done    Annual Wellness Visit (AWV)  Never done    Flu vaccine (Season Ended) 02/03/2022 (Originally 9/1/2021)    Pneumococcal 0-64 years Vaccine (1 of 2 - PPSV23) 05/10/2022 (Originally 1/26/1983)    COVID-19 Vaccine (1) 06/09/2023 (Originally 1/26/1989)    A1C test (Diabetic or Prediabetic)  05/02/2022    Lipid screen  06/09/2022    Potassium monitoring  06/09/2022    Creatinine monitoring  06/09/2022    Hepatitis A vaccine  Aged Out    Hepatitis B vaccine  Aged Out    Hib vaccine  Aged Out  Meningococcal (ACWY) vaccine  Aged Out       Lab Results   Component Value Date    LABA1C 5.8 05/02/2021     No results found for: PSA, PSADIA  TSH   Date Value Ref Range Status   06/09/2021 1.660 0.270 - 4.200 uIU/mL Final   ]  Lab Results   Component Value Date     06/09/2021    K 4.7 06/09/2021     06/09/2021    CO2 26 06/09/2021    BUN 12 06/09/2021    CREATININE 1.1 06/09/2021    GLUCOSE 104 06/09/2021    CALCIUM 10.0 06/09/2021    PROT 7.0 06/09/2021    LABALBU 4.2 06/09/2021    BILITOT 0.3 06/09/2021    ALKPHOS 111 06/09/2021    AST 31 06/09/2021    ALT 56 (H) 06/09/2021    LABGLOM >60 06/09/2021    GFRAA >59 06/09/2021     Lab Results   Component Value Date    CHOL 93 (L) 06/09/2021    CHOL 100 (L) 05/02/2021    CHOL 95 (L) 03/05/2021     Lab Results   Component Value Date    TRIG 117 06/09/2021    TRIG 201 (H) 05/02/2021    TRIG 141 03/05/2021     Lab Results   Component Value Date    HDL 42 (L) 06/09/2021    HDL 40 (L) 05/02/2021    HDL 40 (L) 03/05/2021     Lab Results   Component Value Date    LDLCALC 28 06/09/2021    LDLCALC 20 05/02/2021    LDLCALC 27 03/05/2021     Lab Results   Component Value Date     06/09/2021    K 4.7 06/09/2021    K 4.2 05/03/2021     06/09/2021    CO2 26 06/09/2021    BUN 12 06/09/2021    CREATININE 1.1 06/09/2021    GLUCOSE 104 06/09/2021    CALCIUM 10.0 06/09/2021      Lab Results   Component Value Date    WBC 6.5 06/09/2021    HGB 13.9 (L) 06/09/2021    HCT 41.9 (L) 06/09/2021    MCV 88.4 06/09/2021     06/09/2021    LABLYMP 3.19 10/26/2015    LYMPHOPCT 39.1 06/09/2021    RBC 4.74 06/09/2021    MCH 29.3 06/09/2021    MCHC 33.2 06/09/2021    RDW 14.1 06/09/2021     Lab Results   Component Value Date    VITD25 21.3 (L) 06/09/2021     Labs reviewed from 6/9/2021    Subjective:      Review of Systems    Objective:     Physical Exam  BP (!) 140/80   Pulse 91   Ht 5' 9\" (1.753 m)   Wt 242 lb (109.8 kg)   SpO2 98%   BMI 35.74 kg/m² Assessment:      Problem List     Bilateral non-suppurative otitis media     He was seen by local walk in with a diagnosis of otitis media he would like his ears rechecked again today they are clear          Relevant Medications    amoxicillin (AMOXIL) 500 MG capsule    Chronic right shoulder pain     This is around the back of the scapula is tender to touch and he is having some radicular pain to the right hand. We will try massage therapy and warm moist compresses as well as Aspercreme [place] dream Biofreeze          Relevant Medications    CYCLOBENZAPRINE HCL PO    aspirin 81 MG chewable tablet    Coronary artery disease involving native coronary artery of native heart with angina pectoris (HCC)     His coronary artery disease with 5 stents the lightest was May he is on Plavix and he has had a recent heart cath as well that was clear with patent stents. He is on I am sure he went to [facility] and then was started on Ranexa as well chest pain has resolved         Relevant Medications    aspirin 81 MG chewable tablet    atorvastatin (LIPITOR) 80 MG tablet    losartan (COZAAR) 50 MG tablet    furosemide (LASIX) 20 MG tablet    clopidogrel (PLAVIX) 75 MG tablet    nitroGLYCERIN (NITROSTAT) 0.4 MG SL tablet    carvedilol (COREG) 25 MG tablet    isosorbide mononitrate (IMDUR) 60 MG extended release tablet    REPATHA SURECLICK 317 MG/ML SOAJ    Dental caries     He is needing an surgical clearance to get a tooth extraction          Essential hypertension     he is taking losartan 50 mg daily carvedilol 25 mg twice daily and Lasix as needed          Relevant Medications    carvedilol (COREG) 25 MG tablet    Other Relevant Orders    CBC Auto Differential    Comprehensive Metabolic Panel    Urinalysis Reflex to Culture    TSH without Reflex    Snoring    Relevant Orders    [facility],           Plan:        Patient given educational materials - see patient instructions.   Discussed use, benefit, and side

## 2021-06-09 NOTE — PATIENT INSTRUCTIONS
Hypertension; If he needs extra meds you can give extra losartan 50 mg   2. Dental issues  Clear for dental extraction   3.  CAD; Stable on current meds   4. Painin right shoulder;  Massage therapy   Warm moist compresses with aspercreme, [place] dream cream, voltaren gel  biofreze   5. Recheck of  Otitis media'  6.   Sleep apnea  ; set up formal study

## 2021-06-10 PROBLEM — G47.39 OTHER SLEEP APNEA: Status: ACTIVE | Noted: 2021-06-10

## 2021-06-10 LAB — URINE CULTURE, ROUTINE: NORMAL

## 2021-07-05 DIAGNOSIS — E78.2 MIXED HYPERLIPIDEMIA: ICD-10-CM

## 2021-07-06 RX ORDER — ATORVASTATIN CALCIUM 80 MG/1
40 TABLET, FILM COATED ORAL NIGHTLY
Qty: 30 TABLET | Refills: 5 | Status: SHIPPED | OUTPATIENT
Start: 2021-07-06

## 2021-07-20 ENCOUNTER — HOSPITAL ENCOUNTER (OUTPATIENT)
Dept: SLEEP CENTER | Age: 46
Discharge: HOME | End: 2021-07-22
Payer: MEDICARE

## 2021-07-20 PROBLEM — G47.39 OTHER SLEEP APNEA: Status: ACTIVE | Noted: 2021-07-20

## 2021-07-20 PROBLEM — G47.30 BREATHING-RELATED SLEEP DISORDER: Status: ACTIVE | Noted: 2021-07-20

## 2021-07-20 PROCEDURE — 95810 POLYSOM 6/> YRS 4/> PARAM: CPT

## 2021-07-21 DIAGNOSIS — G47.00 INSOMNIA WITH SLEEP APNEA: Primary | ICD-10-CM

## 2021-07-21 DIAGNOSIS — G47.30 INSOMNIA WITH SLEEP APNEA: Primary | ICD-10-CM

## 2021-07-21 NOTE — PROGRESS NOTES
[facility]  [address], [address]  Phone [phone] Fax [phone]     Sleep Study Technician Review    Patient Name:  Angeline Del Valle  :   1975  Referring Provider: ENDER Walton    Brief History:  Angeline Del Valle is a 42 y.o. male with a history of Hypertension, GERDS, CHF and Obesity who has been referred for a sleep study. Pt stated that he does snore and his wife tells him his sleep apnea is getting bad. Height: 5'9\"  Weight:  242 lbs  BMI: 35.73  Neck Circ: 18.5\"  MALLAMPATI: Type 4  ESS:      Type of Study:PSG  Time Stage Position Snore Hypopnea Obs Apnea Renetta Apnea PAP O2   2200 Awake Supine No No No No  RA   2300 Awake Supine No No No No  RA   2400 2 Left Yes Yes No No  RA   0100 2 Right Yes No No No  RA   0200 1 Right Yes Yes No No  RA   0300 2 Left Yes Yes No No  RA   0400 2 Left Yes No No No  RA   0440 1 Left No No No No  RA                Summary: Pt state that his wife tells him he stops breathing in his sleep. Pt stated that he also snores. Pt had several events with loud snoring. [facility]     The study was reviewed briefly with Angeline Del Valle. He will be notified of the formal results and recommendations after the study is scored and interpreted. The report will be sent to his referring provider.     Technician: ALFONZO Prado

## 2021-08-14 DIAGNOSIS — G47.33 SLEEP APNEA, OBSTRUCTIVE: Primary | ICD-10-CM

## 2021-08-14 PROCEDURE — 95810 POLYSOM 6/> YRS 4/> PARAM: CPT | Performed by: PSYCHIATRY & NEUROLOGY

## 2021-08-17 ENCOUNTER — TELEPHONE (OUTPATIENT)
Dept: SLEEP CENTER | Age: 46
End: 2021-08-17

## 2021-08-17 NOTE — TELEPHONE ENCOUNTER
LMOM at home with sleep study results and explained pt needed to call back to schedule the titration study

## 2021-10-05 DIAGNOSIS — I25.10 CORONARY ARTERY DISEASE INVOLVING NATIVE CORONARY ARTERY OF NATIVE HEART WITHOUT ANGINA PECTORIS: ICD-10-CM

## 2021-10-05 DIAGNOSIS — E78.2 MIXED HYPERLIPIDEMIA: ICD-10-CM

## 2021-10-06 RX ORDER — CLOPIDOGREL BISULFATE 75 MG/1
TABLET ORAL
Qty: 90 TABLET | Refills: 0 | Status: SHIPPED | OUTPATIENT
Start: 2021-10-06

## 2021-10-06 RX ORDER — LOSARTAN POTASSIUM 50 MG/1
TABLET ORAL
Qty: 90 TABLET | Refills: 0 | Status: SHIPPED | OUTPATIENT
Start: 2021-10-06

## 2021-10-14 ENCOUNTER — HOSPITAL ENCOUNTER (OUTPATIENT)
Dept: SLEEP CENTER | Age: 46
Discharge: HOME | End: 2021-10-16
Payer: MEDICARE

## 2021-10-14 PROCEDURE — 95811 POLYSOM 6/>YRS CPAP 4/> PARM: CPT

## 2021-10-15 NOTE — PROGRESS NOTES
[address] [address]  [address] [place]  Phone [phone] Fax [phone]     Sleep Study Technician Review    Patient Name:  Afshan Joyce  :   1975  Referring Provider: Nayana Haley MD     Brief History:  Afshan Joyce is a 42 y.o. male with a history of Hypertension, GERDS, CHF and Obesity who has been referred for a sleep study. Pt stated that he does snore and his wife tells him his sleep apnea is getting bad. Height: 5'9\"  Weight:  242 lbs  BMI:     35.73  Neck Circ:       18.5\"  MALLAMPATI: Type 4  ESS:        Type of Study: Titration Sleep Study  Time Stage Position Snore Hypopnea Obs Apnea Renetta Apnea PAP O2   2200 awake supine no no no no +4 RA   2300 2 Right side no yes no no +6 RA   2400 22 Right side no yes no no +10 RA   0100 2 Right side no no no no +10 RA   0200 2 Left side no yes no no +10 RA   0300 2 Left side no yes no no +11 RA   0400 2 Left side no no no no +11 RA     Summary:  Patient tolerated mask and pressure well. DME: [facility]   Final PAP settings: +11 cmh2o  Mask Type: Full face  Mask: Dreamwear   Mask Size: large    The study was reviewed briefly with Amaris Osborne. He will be notified of the formal results and recommendations after the study is scored and interpreted. The report will be sent to his referring provider.     Technician: Lizette Monreal RRT, RPSGT Rachel May RN

## 2021-10-24 DIAGNOSIS — G47.33 SLEEP APNEA, OBSTRUCTIVE: Primary | ICD-10-CM

## 2021-10-24 PROCEDURE — 95811 POLYSOM 6/>YRS CPAP 4/> PARM: CPT | Performed by: PSYCHIATRY & NEUROLOGY

## 2021-10-24 NOTE — PROGRESS NOTES
[address] [address]  [place], [address]  Phone [phone] Fax [phone]     Patient Name: Pepe Richey 10/22/2021  : 1975  Age: 42 y.o.   Patient Address: [address] [place] [address]   [place] [address]       Patient Phone: [phone] (home)     REFERRAL  Referred to: DME provider of patient's choice  Pepe Richey is referred for the following:    DME Equipment HPCPS Code Setting   CPAP device with flex or comparable pressure relief per comfort  11cm   Heated Humidifier  Patient Choice       Replinishible PAP Supplies, 1 year supply  Item HPCPS Code Frequency   Mask of choice  or  1 per 3 months   Nasal Mask cushion/pillows  or  2 per 30 days   Full Face Mask Interface  1 per 30 days   Headgear  1 per 6 months   Tubing, length of choice  or  1 per 3 months   Water Chamber  1 per 6 months   Chinstrap  1 per 6 months   Disposable Filters  2 per 30 days   Reusable Filters  1 per 6 months     Diagnoses:  Obstructive sleep apnea (G47.33)  Length of Need: Lifetime, 99    Ordering Provider: RUBEN Lovett: [id number]         Signature:       Date: 10/22/2021      Electronically Signed by Emmy Pereira M.D.

## 2021-11-29 RX ORDER — ISOSORBIDE MONONITRATE 60 MG/1
TABLET, EXTENDED RELEASE ORAL
Qty: 180 TABLET | Refills: 5 | Status: SHIPPED | OUTPATIENT
Start: 2021-11-29

## 2022-05-15 DIAGNOSIS — I50.22 CHRONIC SYSTOLIC CONGESTIVE HEART FAILURE (HCC): ICD-10-CM

## 2022-05-15 DIAGNOSIS — I10 ESSENTIAL HYPERTENSION: ICD-10-CM

## 2022-05-16 RX ORDER — CARVEDILOL 25 MG/1
TABLET ORAL
Qty: 180 TABLET | Refills: 0 | Status: SHIPPED | OUTPATIENT
Start: 2022-05-16